# Patient Record
Sex: FEMALE | Race: WHITE | NOT HISPANIC OR LATINO
[De-identification: names, ages, dates, MRNs, and addresses within clinical notes are randomized per-mention and may not be internally consistent; named-entity substitution may affect disease eponyms.]

---

## 2017-01-11 PROBLEM — E89.0 POSTABLATIVE HYPOTHYROIDISM: Status: ACTIVE | Noted: 2017-01-11

## 2017-02-15 ENCOUNTER — HOSPITAL (OUTPATIENT)
Dept: OTHER | Age: 42
End: 2017-02-15
Attending: OBSTETRICS & GYNECOLOGY

## 2018-02-20 ENCOUNTER — HOSPITAL (OUTPATIENT)
Dept: OTHER | Age: 43
End: 2018-02-20
Attending: OBSTETRICS & GYNECOLOGY

## 2019-04-03 ENCOUNTER — HOSPITAL ENCOUNTER (OUTPATIENT)
Age: 44
Discharge: HOME | End: 2019-04-03
Payer: COMMERCIAL

## 2019-04-03 VITALS
WEIGHT: 200 LBS | HEART RATE: 64 BPM | TEMPERATURE: 98 F | RESPIRATION RATE: 20 BRPM | SYSTOLIC BLOOD PRESSURE: 137 MMHG | BODY MASS INDEX: 34.15 KG/M2 | OXYGEN SATURATION: 98 % | HEIGHT: 64 IN | DIASTOLIC BLOOD PRESSURE: 82 MMHG

## 2019-04-03 DIAGNOSIS — J02.0 STREPTOCOCCAL SORE THROAT: Primary | ICD-10-CM

## 2019-04-03 DIAGNOSIS — J01.90 ACUTE SINUSITIS, RECURRENCE NOT SPECIFIED, UNSPECIFIED LOCATION: ICD-10-CM

## 2019-04-03 PROCEDURE — 99203 OFFICE O/P NEW LOW 30 MIN: CPT

## 2019-04-03 PROCEDURE — 87430 STREP A AG IA: CPT

## 2019-04-03 PROCEDURE — 99204 OFFICE O/P NEW MOD 45 MIN: CPT

## 2019-04-03 RX ORDER — AMOXICILLIN AND CLAVULANATE POTASSIUM 875; 125 MG/1; MG/1
1 TABLET, FILM COATED ORAL 2 TIMES DAILY
Qty: 20 TABLET | Refills: 0 | Status: SHIPPED | OUTPATIENT
Start: 2019-04-03 | End: 2019-04-13

## 2019-04-03 RX ORDER — FLUCONAZOLE 150 MG/1
150 TABLET ORAL ONCE
Qty: 1 TABLET | Refills: 0 | Status: SHIPPED | OUTPATIENT
Start: 2019-04-03 | End: 2019-04-03

## 2019-04-03 NOTE — ED INITIAL ASSESSMENT (HPI)
REPORTS SORE THROAT AND COUGH X 1 WEEK. STATES SYMPTOMS WORSEN AT NIGHT. ALSO STATES SHE HAS HX OF ASTHMA AND HAS HAD TO USE HER ALBUTEROL INHALER 4-5 X DAY. DENIES FEVERS/CHILLS.

## 2019-04-03 NOTE — ED PROVIDER NOTES
Patient presents with:  Cough/URI      HPI:     Dena Lennox is a 53year old female who presents for evaluation of a chief complaint of sore throat, sinus discomfort, sinus congestion, and post nasal drip for over a week.   The patient states she just re Relationship status: Not on file      Intimate partner violence:        Fear of current or ex partner: Not on file        Emotionally abused: Not on file        Physically abused: Not on file        Forced sexual activity: Not on file    Other Topics treat her for strep throat and sinusitis with Augmentin. She is requesting a Diflucan due to her tendency to obtain a yeast vaginitis after taking a course of antibiotics.   I will recommend she continue supportive care and follow-up closely with her prima

## 2019-09-27 ENCOUNTER — HOSPITAL (OUTPATIENT)
Dept: OTHER | Age: 44
End: 2019-09-27
Attending: OBSTETRICS & GYNECOLOGY

## 2019-10-04 ENCOUNTER — HOSPITAL (OUTPATIENT)
Dept: OTHER | Age: 44
End: 2019-10-04
Attending: OBSTETRICS & GYNECOLOGY

## 2019-10-15 ENCOUNTER — HOSPITAL (OUTPATIENT)
Dept: OTHER | Age: 44
End: 2019-10-15
Attending: OBSTETRICS & GYNECOLOGY

## 2019-11-07 ENCOUNTER — HOSPITAL (OUTPATIENT)
Dept: OTHER | Age: 44
End: 2019-11-07
Attending: OBSTETRICS & GYNECOLOGY

## 2023-05-02 ENCOUNTER — OFFICE VISIT (OUTPATIENT)
Dept: INTERNAL MEDICINE CLINIC | Facility: CLINIC | Age: 48
End: 2023-05-02

## 2023-05-02 VITALS
WEIGHT: 208 LBS | BODY MASS INDEX: 35.51 KG/M2 | SYSTOLIC BLOOD PRESSURE: 114 MMHG | HEIGHT: 64 IN | DIASTOLIC BLOOD PRESSURE: 70 MMHG | HEART RATE: 57 BPM

## 2023-05-02 DIAGNOSIS — Z00.00 ANNUAL PHYSICAL EXAM: ICD-10-CM

## 2023-05-02 DIAGNOSIS — E89.0 POSTABLATIVE HYPOTHYROIDISM: ICD-10-CM

## 2023-05-02 DIAGNOSIS — Z12.31 VISIT FOR SCREENING MAMMOGRAM: Primary | ICD-10-CM

## 2023-05-02 DIAGNOSIS — M19.90 ARTHRITIS: ICD-10-CM

## 2023-05-02 PROCEDURE — 3074F SYST BP LT 130 MM HG: CPT | Performed by: NURSE PRACTITIONER

## 2023-05-02 PROCEDURE — 3008F BODY MASS INDEX DOCD: CPT | Performed by: NURSE PRACTITIONER

## 2023-05-02 PROCEDURE — 99386 PREV VISIT NEW AGE 40-64: CPT | Performed by: NURSE PRACTITIONER

## 2023-05-02 PROCEDURE — 3078F DIAST BP <80 MM HG: CPT | Performed by: NURSE PRACTITIONER

## 2023-05-02 RX ORDER — MONTELUKAST SODIUM 10 MG/1
10 TABLET ORAL NIGHTLY
Qty: 90 TABLET | Refills: 0 | Status: SHIPPED | OUTPATIENT
Start: 2023-05-02 | End: 2023-07-31

## 2023-05-02 RX ORDER — MELOXICAM 15 MG/1
15 TABLET ORAL DAILY
COMMUNITY
Start: 2022-04-01 | End: 2023-05-02

## 2023-05-02 RX ORDER — MELOXICAM 15 MG/1
15 TABLET ORAL DAILY
Qty: 90 TABLET | Refills: 0 | Status: SHIPPED | OUTPATIENT
Start: 2023-05-02 | End: 2023-07-31

## 2023-05-02 RX ORDER — FLUTICASONE PROPIONATE 50 MCG
2 SPRAY, SUSPENSION (ML) NASAL DAILY
Qty: 1 EACH | Refills: 3 | Status: SHIPPED | OUTPATIENT
Start: 2023-05-02 | End: 2023-07-31

## 2023-05-02 RX ORDER — VALACYCLOVIR HYDROCHLORIDE 500 MG/1
500 TABLET, FILM COATED ORAL DAILY
Qty: 90 TABLET | Refills: 0 | Status: SHIPPED | OUTPATIENT
Start: 2023-05-02

## 2023-05-02 NOTE — ASSESSMENT & PLAN NOTE
Normal exam.  Labs as ordered. Skin check normal.  No significant abnormal nevi. Breast exam completed-no palpable abnormalities, discharge from the nipples or axillary adenopathy. No cervical or inguinal lymphadenopathy. Hernial orifices intact.   Pelvic exam- Follows [facility]  Immunizations-Uptodate

## 2023-05-12 LAB
ABSOLUTE BASOPHILS: 29 CELLS/UL (ref 0–200)
ABSOLUTE EOSINOPHILS: 122 CELLS/UL (ref 15–500)
ABSOLUTE LYMPHOCYTES: 1502 CELLS/UL (ref 850–3900)
ABSOLUTE MONOCYTES: 516 CELLS/UL (ref 200–950)
ABSOLUTE NEUTROPHILS: 3631 CELLS/UL (ref 1500–7800)
ALBUMIN/GLOBULIN RATIO: 1.6 (CALC) (ref 1–2.5)
ALBUMIN: 4 G/DL (ref 3.6–5.1)
ALKALINE PHOSPHATASE: 48 U/L (ref 37–153)
ALT: 14 U/L (ref 6–29)
APPEARANCE: CLEAR
AST: 18 U/L (ref 10–35)
BASOPHILS: 0.5 %
BILIRUBIN, TOTAL: 0.4 MG/DL (ref 0.2–1.2)
BILIRUBIN: NEGATIVE
BUN: 13 MG/DL (ref 7–25)
CALCIUM: 9.3 MG/DL (ref 8.6–10.4)
CARBON DIOXIDE: 28 MMOL/L (ref 20–32)
CHLORIDE: 101 MMOL/L (ref 98–110)
CHOL/HDLC RATIO: 3.1 (CALC)
CHOLESTEROL, TOTAL: 172 MG/DL
COLOR: YELLOW
CREATININE: 0.73 MG/DL (ref 0.5–1.03)
EGFR: 100 ML/MIN/1.73M2
EOSINOPHILS: 2.1 %
GLOBULIN: 2.5 G/DL (CALC) (ref 1.9–3.7)
GLUCOSE: 81 MG/DL (ref 65–99)
GLUCOSE: NEGATIVE
HDL CHOLESTEROL: 56 MG/DL
HEMATOCRIT: 34.9 % (ref 35–45)
HEMOGLOBIN: 11.8 G/DL (ref 11.7–15.5)
KETONES: NEGATIVE
LDL-CHOLESTEROL: 99 MG/DL (CALC)
LEUKOCYTE ESTERASE: NEGATIVE
LYMPHOCYTES: 25.9 %
MCH: 30.8 PG (ref 27–33)
MCHC: 33.8 G/DL (ref 32–36)
MCV: 91.1 FL (ref 80–100)
MONOCYTES: 8.9 %
MPV: 11.8 FL (ref 7.5–12.5)
NEUTROPHILS: 62.6 %
NITRITE: NEGATIVE
NON-HDL CHOLESTEROL: 116 MG/DL (CALC)
OCCULT BLOOD: NEGATIVE
PH: 6 (ref 5–8)
PLATELET COUNT: 163 THOUSAND/UL (ref 140–400)
POTASSIUM: 4.2 MMOL/L (ref 3.5–5.3)
PROTEIN, TOTAL: 6.5 G/DL (ref 6.1–8.1)
PROTEIN: NEGATIVE
RDW: 12.3 % (ref 11–15)
RED BLOOD CELL COUNT: 3.83 MILLION/UL (ref 3.8–5.1)
SODIUM: 135 MMOL/L (ref 135–146)
SPECIFIC GRAVITY: 1 (ref 1–1.03)
T4, FREE: 1.6 NG/DL (ref 0.8–1.8)
TRIGLYCERIDES: 83 MG/DL
TSH W/REFLEX TO FT4: 0.26 MIU/L
VITAMIN B12: 396 PG/ML (ref 200–1100)
WHITE BLOOD CELL COUNT: 5.8 THOUSAND/UL (ref 3.8–10.8)

## 2023-05-13 ENCOUNTER — PATIENT MESSAGE (OUTPATIENT)
Dept: INTERNAL MEDICINE CLINIC | Facility: CLINIC | Age: 48
End: 2023-05-13

## 2023-05-15 RX ORDER — LEVOTHYROXINE SODIUM 137 UG/1
137 TABLET ORAL 2 TIMES DAILY
COMMUNITY
Start: 2023-05-12 | End: 2023-05-15

## 2023-05-15 NOTE — TELEPHONE ENCOUNTER
From: Michael Ash  To: JAYSON Gutierrez  Sent: 5/13/2023 7:20 AM CDT  Subject: Labs    Good morning! Thank you for sending a lab update quickly! My endocrinologist saw the labs and decreased my dose of levothyroxine. For some odd reason [facility] said my insurance would not cover a vitamin D level. When I see my rheumatologist in August, I will have her enter that lab with her other ones and see if, I can get it covered. Thank you!

## 2023-05-31 ENCOUNTER — HOSPITAL ENCOUNTER (OUTPATIENT)
Age: 48
Discharge: HOME | End: 2023-05-31
Payer: COMMERCIAL

## 2023-05-31 VITALS
OXYGEN SATURATION: 100 % | DIASTOLIC BLOOD PRESSURE: 76 MMHG | TEMPERATURE: 98 F | HEART RATE: 63 BPM | SYSTOLIC BLOOD PRESSURE: 143 MMHG | RESPIRATION RATE: 20 BRPM

## 2023-05-31 DIAGNOSIS — J02.9 VIRAL PHARYNGITIS: Primary | ICD-10-CM

## 2023-05-31 LAB — S PYO AG THROAT QL IA.RAPID: NEGATIVE

## 2023-05-31 PROCEDURE — 99203 OFFICE O/P NEW LOW 30 MIN: CPT

## 2023-05-31 PROCEDURE — 99212 OFFICE O/P EST SF 10 MIN: CPT

## 2023-05-31 PROCEDURE — 87651 STREP A DNA AMP PROBE: CPT | Performed by: NURSE PRACTITIONER

## 2023-11-01 ENCOUNTER — PATIENT MESSAGE (OUTPATIENT)
Dept: INTERNAL MEDICINE CLINIC | Facility: CLINIC | Age: 48
End: 2023-11-01

## 2023-11-01 DIAGNOSIS — E89.0 POSTABLATIVE HYPOTHYROIDISM: ICD-10-CM

## 2023-11-06 RX ORDER — ALBUTEROL SULFATE 90 UG/1
2 AEROSOL, METERED RESPIRATORY (INHALATION) EVERY 6 HOURS PRN
Qty: 3 EACH | Refills: 1 | Status: SHIPPED | OUTPATIENT
Start: 2023-11-06

## 2024-01-08 ENCOUNTER — OFFICE VISIT (OUTPATIENT)
Dept: INTERNAL MEDICINE CLINIC | Facility: CLINIC | Age: 49
End: 2024-01-08
Payer: COMMERCIAL

## 2024-01-08 VITALS
OXYGEN SATURATION: 100 % | HEIGHT: 64 IN | BODY MASS INDEX: 35.85 KG/M2 | WEIGHT: 210 LBS | SYSTOLIC BLOOD PRESSURE: 144 MMHG | DIASTOLIC BLOOD PRESSURE: 84 MMHG | HEART RATE: 55 BPM

## 2024-01-08 DIAGNOSIS — I10 PRIMARY HYPERTENSION: Primary | ICD-10-CM

## 2024-01-08 PROCEDURE — 3008F BODY MASS INDEX DOCD: CPT | Performed by: NURSE PRACTITIONER

## 2024-01-08 PROCEDURE — 99213 OFFICE O/P EST LOW 20 MIN: CPT | Performed by: NURSE PRACTITIONER

## 2024-01-08 PROCEDURE — 3077F SYST BP >= 140 MM HG: CPT | Performed by: NURSE PRACTITIONER

## 2024-01-08 PROCEDURE — 3079F DIAST BP 80-89 MM HG: CPT | Performed by: NURSE PRACTITIONER

## 2024-01-08 RX ORDER — HYDROXYCHLOROQUINE SULFATE 200 MG/1
400 TABLET, FILM COATED ORAL DAILY
COMMUNITY

## 2024-01-08 RX ORDER — LEVOTHYROXINE SODIUM 137 UG/1
137 TABLET ORAL DAILY
COMMUNITY

## 2024-01-08 RX ORDER — LISINOPRIL 2.5 MG/1
2.5 TABLET ORAL DAILY
Qty: 90 TABLET | Refills: 2 | Status: SHIPPED | OUTPATIENT
Start: 2024-01-08 | End: 2024-07-06

## 2024-01-08 RX ORDER — MELOXICAM 15 MG/1
15 TABLET ORAL
COMMUNITY
Start: 2023-08-29

## 2024-01-08 NOTE — PROGRESS NOTES
HPI:    Patient ID: Arlene Quiroga is a 48 year old female.    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  Blood pressure 144/84, pulse 55, height 5' 4\" (1.626 m), weight 210 lb (95.3 kg), SpO2 100%.   B/P 138/90.    Immunization History   Administered Date(s) Administered    Covid-19 Vaccine Pfizer 30 mcg/0.3 ml 01/17/2021, 02/07/2021, 01/07/2022    Influenza 10/01/2014, 09/21/2020, 09/22/2021, 09/06/2022    TDAP 06/11/2007, 05/15/2018       Past Medical History:   Diagnosis Date    Asthma     Essential hypertension     Hypothyroidism     s/p FRANCISCO for Graves disease      Past Surgical History:   Procedure Laterality Date    D & c        Social History     Socioeconomic History    Marital status:    Tobacco Use    Smoking status: Never    Smokeless tobacco: Never   Vaping Use    Vaping Use: Never used   Substance and Sexual Activity    Alcohol use: Yes     Comment: social    Drug use: Never          Review of Systems   Constitutional:  Negative for chills, fatigue and fever.   HENT:  Negative for congestion, ear discharge, ear pain, facial swelling, hearing loss, postnasal drip, sinus pressure, sore throat and trouble swallowing.    Eyes:  Negative for pain, discharge, redness and visual disturbance.   Respiratory:  Negative for cough, chest tightness, shortness of breath and wheezing.    Cardiovascular:  Negative for chest pain, palpitations and leg swelling.   Gastrointestinal:  Negative for abdominal distention, abdominal pain, constipation, diarrhea, nausea and vomiting.   Endocrine: Negative for cold intolerance, heat intolerance, polydipsia, polyphagia and polyuria.   Genitourinary:  Negative for difficulty urinating, dysuria, pelvic pain and vaginal bleeding.   Musculoskeletal:  Negative for back pain, gait problem, neck pain and neck stiffness.   Skin:  Negative  for color change and rash.   Neurological:  Negative for dizziness, seizures, weakness and headaches.   Psychiatric/Behavioral:  Negative for agitation and sleep disturbance. The patient is not nervous/anxious.               Current Outpatient Medications   Medication Sig Dispense Refill    hydroxychloroquine 200 MG Oral Tab Take 2 tablets (400 mg total) by mouth daily.      Meloxicam 15 MG Oral Tab Take 1 tablet (15 mg total) by mouth.      lisinopril 2.5 MG Oral Tab Take 1 tablet (2.5 mg total) by mouth daily. 90 tablet 2    albuterol 108 (90 Base) MCG/ACT Inhalation Aero Soln Inhale 2 puffs into the lungs every 6 (six) hours as needed for Wheezing. 3 each 1    montelukast 10 MG Oral Tab Take 1 tablet (10 mg total) by mouth nightly. 90 tablet 3    valACYclovir 500 MG Oral Tab Take 1 tablet (500 mg total) by mouth daily. 90 tablet 0    Ivermectin 1 % External Cream       levothyroxine 137 MCG Oral Tab Take 137 mcg by mouth daily.       Allergies:  Allergies   Allergen Reactions    Tapazole [Thiamazole]       PHYSICAL EXAM:   Physical Exam  Constitutional:       General: She is not in acute distress.     Appearance: Normal appearance. She is not ill-appearing, toxic-appearing or diaphoretic.   HENT:      Head: Normocephalic.      Mouth/Throat:      Mouth: Mucous membranes are moist.   Cardiovascular:      Rate and Rhythm: Normal rate and regular rhythm.      Pulses: Normal pulses.      Heart sounds: Normal heart sounds.   Pulmonary:      Effort: Pulmonary effort is normal.   Abdominal:      General: Abdomen is flat.      Palpations: Abdomen is soft.   Musculoskeletal:         General: Normal range of motion.      Cervical back: Normal range of motion.   Skin:     General: Skin is warm and dry.   Neurological:      Mental Status: She is alert and oriented to person, place, and time.   Psychiatric:         Mood and Affect: Mood normal.         Behavior: Behavior normal.         Thought Content: Thought content normal.          Judgment: Judgment normal.       /84 (BP Location: Right arm, Patient Position: Sitting, Cuff Size: adult)   Pulse 55   Ht 5' 4\" (1.626 m)   Wt 210 lb (95.3 kg)   SpO2 100%   BMI 36.05 kg/m²   Wt Readings from Last 2 Encounters:   01/08/24 210 lb (95.3 kg)   05/02/23 208 lb (94.3 kg)     Body mass index is 36.05 kg/m².(2)  Lab Results   Component Value Date    WBC 5.8 05/11/2023    RBC 3.83 05/11/2023    HGB 11.8 05/11/2023    HCT 34.9 (L) 05/11/2023    MCV 91.1 05/11/2023    MCH 30.8 05/11/2023    MCHC 33.8 05/11/2023    RDW 12.3 05/11/2023     05/11/2023      Lab Results   Component Value Date    GLU 81 05/11/2023    BUN 13 05/11/2023    BUNCREA NOT APPLICABLE 05/11/2023    CREATSERUM 0.73 05/11/2023    CA 9.3 05/11/2023    ALKPHO 48 05/11/2023    AST 18 05/11/2023    ALT 14 05/11/2023    BILT 0.4 05/11/2023    TP 6.5 05/11/2023    ALB 4.0 05/11/2023    GLOBULIN 2.5 05/11/2023    AGRATIO 1.6 05/11/2023     05/11/2023    K 4.2 05/11/2023     05/11/2023    CO2 28 05/11/2023      No results found for: \"EAG\", \"A1C\"   Lab Results   Component Value Date    CHOLEST 172 05/11/2023    TRIG 83 05/11/2023    HDL 56 05/11/2023    LDL 99 05/11/2023    TCHDLRATIO 3.1 05/11/2023    NONHDLC 116 05/11/2023      Lab Results   Component Value Date    T4F 1.6 05/11/2023    TSH 0.572 03/13/2021    TSHT4 0.26 (L) 05/11/2023                ASSESSMENT/PLAN:     Problem List Items Addressed This Visit       Primary hypertension - Primary     Blood pressure 144/84, pulse 55, height 5' 4\" (1.626 m), weight 210 lb (95.3 kg), SpO2 100%.    Will start on lisinopril 2.5mg po daily.  If blood pressure remains elevated may take an additional pill in a 24 hour period.  If she developes a cough may try amlodipine.    Controlling High Blood Pressure   High blood pressure (hypertension) is often called the silent killer. This is because many people who have it, don’t know it. It can be very dangerous. High blood  pressure can raise your risk of heart attack, stroke, heart disease, and heart failure. Controlling your blood pressure can lower your risk of these problems. It's important to check yourblood pressure regularly. It can save your life.   Blood pressure measurements are given as 2 numbers. Systolic blood pressure is the upper number. This is the pressure when the heart contracts. Diastolic blood pressure is the lower number. This is the pressure when the heartrelaxes between beats.   Blood pressure is grouped like this:   Normal blood pressure. This is systolic of less than 120 and diastolic of less than 80 (120/80).  Elevated blood pressure.  This is systolic of 120 to 129 and diastolic less than 80.  Stage 1 high blood pressure.  This is systolic of 130 to 139 or diastolic between 80 to 89.  Stage 2 high blood pressure.  This is systolic of 140 or higher or diastolic of 90 or higher.  A heart-healthy lifestyle can help you control your blood pressure withoutmedicines. Below are some things you can do to have a heart-healthy lifestyle.     Eat heart-healthy foods   Choose low-salt, low-fat foods. Limit your sodium to 2,300 mg per day or the amount advised by your healthcare provider.  Limit canned, dried, cured, packaged, and fast foods. These can contain a lot of salt.  Eat 8 to 10 servings of fruits and vegetables every day.  Choose lean meats, fish, or chicken.  Eat whole-grain pasta, brown rice, and beans.  Eat 2 to 3 servings of low-fat or fat-free dairy products.  Ask your doctor about the DASH eating plan. This plan helps reduce blood pressure.  When you go to a restaurant, ask that your meal be made with no added salt.    Stay at a healthy weight   Ask your healthcare provider how many calories to eat a day. Then stick to that number.  Ask your provider what weight range is healthiest for you. If you are overweight, a weight loss of only 3% to 5% of your body weight can help lower blood pressure. A good  weight loss goal is to lose 10% of your body weight in a year.  Limit snacks and sweets.  Get regular exercise.    Get more active   Find activities you enjoy. They can be done alone or with friends or family. Try bicycling, dancing, walking, or jogging.  Park farther away from building entrances to walk more.  Use stairs instead of the elevator.  When you can, walk or bike instead of driving.  Gibson leaves, garden, or do household repairs.  Be active at a moderate to vigorous level of physical activity for at least 30 minutes a day for at least 5 days a week.     Manage stress   Make time to relax and enjoy life. Find time to laugh.  Talk about your concerns with your loved ones and your healthcare provider.  Visit with family and friends, and keep up with hobbies.    Limit alcohol and quit smoking   Men should have no more than 2 drinks per day.  Women should have no more than 1 drink per day.  If you smoke, make a plan to stop. Talk with your healthcare provider for help. Smoking greatly raises your risk for heart disease and stroke. Ask your provider about stop-smoking programs and other support.    Blood pressure medicines  If your lifestyle changes aren’t enough, your healthcare provider may prescribe high blood pressure medicine. Take all medicines as prescribed. If you have any questions about yourmedicines, ask your provider before stopping or changing them.   [employer] last reviewed this educational content on12/1/2021    © 3745-2754 [employer]. All rights reserved. This information is not intended as a substitute for professional medical care. Always follow yourK[facility] Health Troycare professional's instruction    Video [email or web address]™  What is High Blood Pressure?  Understand what blood pressure is, the health risks of having high blood pressure, the factors that put you at risk for having high blood pressure, and the importance of working with your healthcare provider to control it.  To watch the  video:  Scan the QR code  Using your mobile device, scan the following code:  OR  Go to the website:  [employer]  Enter the prescription code:  3414E    © 3277-3059 [employer]. All rights reserved. This information is not intended as a substitute for professional medical care. Always follow your healthcare professional's instructions.    Video [employer]  Eating Well with High Blood Pressure  Certain foods can make your blood pressure go too high. Watch and learn how easy it is to have delicious meals without harming your health.     To watch the video:  Scan the QR code  Using your mobile device, scan the following code:  OR  Go to the website:  www.kramesvideo.com  Enter the prescription code:   QIX       © 2125-3648 [employer]. All rights reserved. This information is not intended as a substitute for professional medical care. Always follow your healthcare professional's instructions.    Taking Your Blood Pressure  Blood pressure is the force of blood against the artery wall as it moves from the heart through the blood vessels. You can take your own blood pressure reading using a digital monitor. Take your readings the same each time, usingthe same arm. Take readings as often as your healthcare provider advises.   About blood pressure monitors  Blood pressure monitors are designed for certain ages and cases. You can find monitors for older adults, for pregnant women, and for children. Make sure theone you choose is the right one for your age and situation.   Experts advise an automatic cuff monitor that fits on your upper arm (bicep). The cuff should fit your arm size. A cuff that’s too large or too small won't give an accurate reading. Measure around yourupper arm to find your size.   Monitors that attach to your finger or wrist are not as accurate as monitorsfor your upper arm.   Ask your healthcare provider for help in choosing a monitor. Bring your monitorto  your next provider visit if you need help in using it the correct way.   The steps below are general instructions for using an automatic digitalmonitor.   Step 1. Relax    Take your blood pressure at the same time every day, such as in the morning or evening. Or take it at the time your healthcare provider advises.  Wait at least 30 minutes after smoking, eating, or exercising. Don't drink coffee, tea, soda, or other caffeinated drinks before checking your blood pressure. Use the restroom beforehand.  Sit comfortably at a table with both feet on the floor. Don't cross your legs or feet. Place the monitor near you.  Rest for at least 5 minutes before you begin. Make sure there are no distractions. This includes TV, cell phones, and other electronics. Wait to have conversations with others until after you measure you blood pressure.  Step 2. Wrap the cuff    Place your arm on the table, palm up. Your arm should be at the level of your heart. Wrap the cuff around your upper arm, just above your elbow. It’s best done on bare skin, not over clothing. Most cuffs will show you where the blood vessel in the middle of the arm at the inner side of the elbow (the brachial artery) should line up with the cuff. Look in your monitor's instruction booklet for an illustration. You can also bring your cuff to your healthcare provider and have them show you how to correctly place the cuff.  Step 3. Inflate the cuff    Push the button that starts the pump.  The cuff will tighten, then loosen.  The numbers will change. When they stop changing, your blood pressure reading will appear.  Take 2 or 3 readings 1 minute apart, or as advised by your provider.  Step 4. Write down the results of each reading    Write down your blood pressure numbers for each reading. Note the date and time. Keep your results in 1 place, such as a notebook. Even if your monitor has a built-in memory, keep a hard copy of the readings.  Remove the cuff from your  arm. Turn off the machine.  Bring your blood pressure records with you to each provider visit.  If you start a new blood pressure medicine, note the day you started the new medicine. Also note the day if you change the dose of your medicine. Measure your blood pressure before your take your medicine. This information goes on your blood pressure recording sheet. This will help your provider check how well the medicine changes are working.  Ask your provider what numbers mean that you should call them. Also ask what numbers mean that you should get help right away.  [employer] last reviewed this educational content on12/1/2021    © 8068-1463 [employer]. All rights reserved. This information is not intended as a substitute for professional medical care. Always follow yourhealthcare professional's instructions.                        Relevant Medications    lisinopril 2.5 MG Oral Tab          No orders of the defined types were placed in this encounter.      Meds This Visit:  Requested Prescriptions     Signed Prescriptions Disp Refills    lisinopril 2.5 MG Oral Tab 90 tablet 2     Sig: Take 1 tablet (2.5 mg total) by mouth daily.       Imaging & Referrals:  None         JAYSON Jesus

## 2024-01-09 NOTE — ASSESSMENT & PLAN NOTE
Blood pressure 144/84, pulse 55, height 5' 4\" (1.626 m), weight 210 lb (95.3 kg), SpO2 100%.    Will start on lisinopril 2.5mg po daily.  If blood pressure remains elevated may take an additional pill in a 24 hour period.  If she developes a cough may try amlodipine.    Controlling High Blood Pressure   High blood pressure (hypertension) is often called the silent killer. This is because many people who have it, don’t know it. It can be very dangerous. High blood pressure can raise your risk of heart attack, stroke, heart disease, and heart failure. Controlling your blood pressure can lower your risk of these problems. It's important to check yourblood pressure regularly. It can save your life.   Blood pressure measurements are given as 2 numbers. Systolic blood pressure is the upper number. This is the pressure when the heart contracts. Diastolic blood pressure is the lower number. This is the pressure when the heartrelaxes between beats.   Blood pressure is grouped like this:   Normal blood pressure. This is systolic of less than 120 and diastolic of less than 80 (120/80).  Elevated blood pressure.  This is systolic of 120 to 129 and diastolic less than 80.  Stage 1 high blood pressure.  This is systolic of 130 to 139 or diastolic between 80 to 89.  Stage 2 high blood pressure.  This is systolic of 140 or higher or diastolic of 90 or higher.  A heart-healthy lifestyle can help you control your blood pressure withoutmedicines. Below are some things you can do to have a heart-healthy lifestyle.     Eat heart-healthy foods   Choose low-salt, low-fat foods. Limit your sodium to 2,300 mg per day or the amount advised by your healthcare provider.  Limit canned, dried, cured, packaged, and fast foods. These can contain a lot of salt.  Eat 8 to 10 servings of fruits and vegetables every day.  Choose lean meats, fish, or chicken.  Eat whole-grain pasta, brown rice, and beans.  Eat 2 to 3 servings of low-fat or fat-free  dairy products.  Ask your doctor about the DASH eating plan. This plan helps reduce blood pressure.  When you go to a restaurant, ask that your meal be made with no added salt.    Stay at a healthy weight   Ask your healthcare provider how many calories to eat a day. Then stick to that number.  Ask your provider what weight range is healthiest for you. If you are overweight, a weight loss of only 3% to 5% of your body weight can help lower blood pressure. A good weight loss goal is to lose 10% of your body weight in a year.  Limit snacks and sweets.  Get regular exercise.    Get more active   Find activities you enjoy. They can be done alone or with friends or family. Try bicycling, dancing, walking, or jogging.  Park farther away from building entrances to walk more.  Use stairs instead of the elevator.  When you can, walk or bike instead of driving.  [place] leaves, garden, or do household repairs.  Be active at a moderate to vigorous level of physical activity for at least 30 minutes a day for at least 5 days a week.     Manage stress   Make time to relax and enjoy life. Find time to laugh.  Talk about your concerns with your loved ones and your healthcare provider.  Visit with family and friends, and keep up with hobbies.    Limit alcohol and quit smoking   Men should have no more than 2 drinks per day.  Women should have no more than 1 drink per day.  If you smoke, make a plan to stop. Talk with your healthcare provider for help. Smoking greatly raises your risk for heart disease and stroke. Ask your provider about stop-smoking programs and other support.    Blood pressure medicines  If your lifestyle changes aren’t enough, your healthcare provider may prescribe high blood pressure medicine. Take all medicines as prescribed. If you have any questions about yourmedicines, ask your provider before stopping or changing them.   Pao last reviewed this educational content on12/1/2021    © 8817-1679 [employer]. All rights reserved. This information is not intended as a substitute for professional medical care. Always follow yourhealthcare professional's instruction    Video [employer]™  What is High Blood Pressure?  Understand what blood pressure is, the health risks of having high blood pressure, the factors that put you at risk for having high blood pressure, and the importance of working with your healthcare provider to control it.  To watch the video:  Scan the QR code  Using your mobile device, scan the following code:  OR  Go to the website:  [employer]  Enter the prescription code:  3414E    © 4840-8946 [employer]. All rights reserved. This information is not intended as a substitute for professional medical care. Always follow your healthcare professional's instructions.    Video [employer]™  Eating Well with High Blood Pressure  Certain foods can make your blood pressure go too high. Watch and learn how easy it is to have delicious meals without harming your health.     To watch the video:  Scan the QR code  Using your mobile device, scan the following code:  OR  Go to the website:  www.kramesvideo.com  Enter the prescription code:   QIX       © 0172-2878 [employer]. All rights reserved. This information is not intended as a substitute for professional medical care. Always follow your healthcare professional's instructions.    Taking Your Blood Pressure  Blood pressure is the force of blood against the artery wall as it moves from the heart through the blood vessels. You can take your own blood pressure reading using a digital monitor. Take your readings the same each time, usingthe same arm. Take readings as often as your healthcare provider advises.   About blood pressure monitors  Blood pressure monitors are designed for certain ages and cases. You can find monitors for older adults, for pregnant women, and for children. Make sure theone you  choose is the right one for your age and situation.   Experts advise an automatic cuff monitor that fits on your upper arm (bicep). The cuff should fit your arm size. A cuff that’s too large or too small won't give an accurate reading. Measure around yourupper arm to find your size.   Monitors that attach to your finger or wrist are not as accurate as monitorsfor your upper arm.   Ask your healthcare provider for help in choosing a monitor. Bring your monitorto your next provider visit if you need help in using it the correct way.   The steps below are general instructions for using an automatic digitalmonitor.   Step 1. Relax    Take your blood pressure at the same time every day, such as in the morning or evening. Or take it at the time your healthcare provider advises.  Wait at least 30 minutes after smoking, eating, or exercising. Don't drink coffee, tea, soda, or other caffeinated drinks before checking your blood pressure. Use the restroom beforehand.  Sit comfortably at a table with both feet on the floor. Don't cross your legs or feet. Place the monitor near you.  Rest for at least 5 minutes before you begin. Make sure there are no distractions. This includes TV, cell phones, and other electronics. Wait to have conversations with others until after you measure you blood pressure.  Step 2. Wrap the cuff    Place your arm on the table, palm up. Your arm should be at the level of your heart. Wrap the cuff around your upper arm, just above your elbow. It’s best done on bare skin, not over clothing. Most cuffs will show you where the blood vessel in the middle of the arm at the inner side of the elbow (the brachial artery) should line up with the cuff. Look in your monitor's instruction booklet for an illustration. You can also bring your cuff to your healthcare provider and have them show you how to correctly place the cuff.  Step 3. Inflate the cuff    Push the button that starts the pump.  The cuff will  tighten, then loosen.  The numbers will change. When they stop changing, your blood pressure reading will appear.  Take 2 or 3 readings 1 minute apart, or as advised by your provider.  Step 4. Write down the results of each reading    Write down your blood pressure numbers for each reading. Note the date and time. Keep your results in 1 place, such as a notebook. Even if your monitor has a built-in memory, keep a hard copy of the readings.  Remove the cuff from your arm. Turn off the machine.  Bring your blood pressure records with you to each provider visit.  If you start a new blood pressure medicine, note the day you started the new medicine. Also note the day if you change the dose of your medicine. Measure your blood pressure before your take your medicine. This information goes on your blood pressure recording sheet. This will help your provider check how well the medicine changes are working.  Ask your provider what numbers mean that you should call them. Also ask what numbers mean that you should get help right away.  Pao last reviewed this educational content on12/1/2021 © 2000-2022 [employer]. All rights reserved. This information is not intended as a substitute for professional medical care. Always follow yourhealthcare professional's instructions.

## 2024-02-02 DIAGNOSIS — E89.0 POSTABLATIVE HYPOTHYROIDISM: ICD-10-CM

## 2024-02-03 ENCOUNTER — TELEPHONE (OUTPATIENT)
Dept: INTERNAL MEDICINE CLINIC | Facility: CLINIC | Age: 49
End: 2024-02-03

## 2024-02-03 RX ORDER — FLUTICASONE PROPIONATE 50 MCG
2 SPRAY, SUSPENSION (ML) NASAL DAILY
Qty: 48 G | Refills: 3 | Status: SHIPPED | OUTPATIENT
Start: 2024-02-03

## 2024-02-03 NOTE — TELEPHONE ENCOUNTER
PCP showing Unknown Pcp   RN generated PCP change request per protocol.     Dr Martínez =collaborating physician for ESTHER Jesus.

## 2024-02-03 NOTE — TELEPHONE ENCOUNTER
Refill passed per [facility] protocol.     Requested Prescriptions   Pending Prescriptions Disp Refills    FLUTICASONE PROPIONATE 50 MCG/ACT Nasal Suspension [Pharmacy Med Name: FLUTICASONE 50MCG NASAL SP (120) RX] 48 g 0     Sig: SHAKE LIQUID AND USE 2 SPRAYS IN EACH NOSTRIL DAILY       Allergy Medication Protocol Passed - 2/2/2024  8:01 AM        Passed - In person appointment or virtual visit in the past 12 mos or appointment in next 3 mos     Recent Outpatient Visits              3 weeks ago [facility] hypertension    [facility]    Office Visit    9 months ago [facility]    Office Visit    1 year ago Postablative hypothyroidism    Endocrinology - Bernie Snow Mary, MD    Office Visit    2 years ago Encounter for gynecological examination without abnormal finding    [facility] [place] [facility] [facility] [place] [place]    Office Visit    2 years ago Postablative hypothyroidism    Endocrinology - Bernie Snow Mary, MD    Office Visit          Future Appointments         Provider Department Appt Notes    In 6 months Elham Corrales MD [facility] Yearly thyroid check                     Future Appointments         Provider Department Appt Notes    In 6 months Elham Corrales MD [facility] Yearly thyroid check             Recent Outpatient Visits              3 weeks ago [facility]    [facility] Visit    9 months ago [facility]    Office Visit    1 year ago Postablative hypothyroidism    Endocrinology - Bernie Snow Mary, MD    Office  Visit    2 years ago Encounter for gynecological examination without abnormal finding    [facility] - [facility] Lam Elizabeth, MD    Office Visit    2 years ago Postablative hypothyroidism    Endocrinology - Bernie Snow Mary, MD    Office Visit

## 2024-02-05 ENCOUNTER — PATIENT OUTREACH (OUTPATIENT)
Dept: CASE MANAGEMENT | Age: 49
End: 2024-02-05

## 2024-02-05 NOTE — PROCEDURES
The office order for PCP removal request is Approved and finalized on February 5, 2024.    Added Dr. Dank Logan to PCP field  Added JAYSON Jesus to Team member     Thanks,  [facility] Team

## 2024-03-21 DIAGNOSIS — Z00.00 ANNUAL PHYSICAL EXAM: Primary | ICD-10-CM

## 2024-03-29 LAB
ABSOLUTE BASOPHILS: 38 CELLS/UL (ref 0–200)
ABSOLUTE EOSINOPHILS: 72 CELLS/UL (ref 15–500)
ABSOLUTE LYMPHOCYTES: 1498 CELLS/UL (ref 850–3900)
ABSOLUTE MONOCYTES: 442 CELLS/UL (ref 200–950)
ABSOLUTE NEUTROPHILS: 2750 CELLS/UL (ref 1500–7800)
ALBUMIN/GLOBULIN RATIO: 1.8 (CALC) (ref 1–2.5)
ALBUMIN: 4.4 G/DL (ref 3.6–5.1)
ALKALINE PHOSPHATASE: 54 U/L (ref 37–153)
ALT: 13 U/L (ref 6–29)
AST: 19 U/L (ref 10–35)
BASOPHILS: 0.8 %
BILIRUBIN, TOTAL: 0.5 MG/DL (ref 0.2–1.2)
BUN: 17 MG/DL (ref 7–25)
CALCIUM: 9.4 MG/DL (ref 8.6–10.4)
CARBON DIOXIDE: 24 MMOL/L (ref 20–32)
CARDIO CRP(R): 0.7 MG/L
CHLORIDE: 102 MMOL/L (ref 98–110)
CHOL/HDLC RATIO: 2.8 (CALC)
CHOLESTEROL, TOTAL: 177 MG/DL
CREATININE: 0.74 MG/DL (ref 0.5–1.03)
EGFR: 98 ML/MIN/1.73M2
EOSINOPHILS: 1.5 %
GLOBULIN: 2.5 G/DL (CALC) (ref 1.9–3.7)
GLUCOSE: 71 MG/DL (ref 65–99)
HDL CHOLESTEROL: 64 MG/DL
HEMATOCRIT: 37.9 % (ref 35–45)
HEMOGLOBIN: 12.3 G/DL (ref 11.7–15.5)
LDL-CHOLESTEROL: 96 MG/DL (CALC)
LYMPHOCYTES: 31.2 %
MCH: 30.1 PG (ref 27–33)
MCHC: 32.5 G/DL (ref 32–36)
MCV: 92.9 FL (ref 80–100)
MONOCYTES: 9.2 %
MPV: 12 FL (ref 7.5–12.5)
NEUTROPHILS: 57.3 %
NON-HDL CHOLESTEROL: 113 MG/DL (CALC)
PLATELET COUNT: 170 THOUSAND/UL (ref 140–400)
POTASSIUM: 4.2 MMOL/L (ref 3.5–5.3)
PROTEIN, TOTAL: 6.9 G/DL (ref 6.1–8.1)
RDW: 12.5 % (ref 11–15)
RED BLOOD CELL COUNT: 4.08 MILLION/UL (ref 3.8–5.1)
SODIUM: 137 MMOL/L (ref 135–146)
T4, FREE: 1.1 NG/DL (ref 0.8–1.8)
TRIGLYCERIDES: 78 MG/DL
TSH W/REFLEX TO FT4: 8.23 MIU/L
WHITE BLOOD CELL COUNT: 4.8 THOUSAND/UL (ref 3.8–10.8)

## 2024-03-30 DIAGNOSIS — E89.0 POSTABLATIVE HYPOTHYROIDISM: Primary | ICD-10-CM

## 2024-03-30 RX ORDER — LEVOTHYROXINE SODIUM 0.15 MG/1
150 TABLET ORAL
Qty: 90 TABLET | Refills: 1 | Status: SHIPPED | OUTPATIENT
Start: 2024-03-30 | End: 2025-03-29

## 2024-05-13 DIAGNOSIS — E89.0 POSTABLATIVE HYPOTHYROIDISM: ICD-10-CM

## 2024-05-14 RX ORDER — MONTELUKAST SODIUM 10 MG/1
10 TABLET ORAL NIGHTLY
Qty: 90 TABLET | Refills: 3 | OUTPATIENT
Start: 2024-05-14

## 2024-07-04 LAB
T4, FREE: 1.4 NG/DL (ref 0.8–1.8)
TSH W/REFLEX TO FT4: 6.32 MIU/L

## 2024-08-13 DIAGNOSIS — E89.0 POSTABLATIVE HYPOTHYROIDISM: ICD-10-CM

## 2024-08-19 RX ORDER — MONTELUKAST SODIUM 10 MG/1
10 TABLET ORAL NIGHTLY
Qty: 90 TABLET | Refills: 3 | Status: SHIPPED | OUTPATIENT
Start: 2024-08-19

## 2024-08-19 NOTE — TELEPHONE ENCOUNTER
Please review.  Protocol failed / Has no protocol.     Requested Prescriptions   Pending Prescriptions Disp Refills    montelukast 10 MG Oral Tab 90 tablet 3     Sig: Take 1 tablet (10 mg total) by mouth nightly.       Asthma & COPD Medication Protocol Failed - 8/13/2024  3:52 PM        Failed - Asthma Action Score greater than or equal to 20        Failed - Appointment in past 6 or next 3 months      Recent Outpatient Visits              7 months ago Primary hypertension    [facility] Marilee Soliman APRN    Office Visit    1 year ago Visit for screening mammogram    [facility] Marilee Soliman APRN    Office Visit    2 years ago Postablative hypothyroidism    Endocrinology - Bernie Snow Mary, MD    Office Visit    3 years ago Encounter for gynecological examination without abnormal finding    [facility] - [facility] Lam Elizabeth, MD    Office Visit    3 years ago Postablative hypothyroidism    Endocrinology - Bernie Snow Mary, MD    Office Visit          Future Appointments         Provider Department Appt Notes    In 3 days Elham Corrales MD [facility] Yearly thyroid check    In 3 weeks Marilee Soliman APRN [facility] Annual exam 5/2/23                    Failed - AAP/ACT given in last 12 months     No data recorded  No data recorded  No data recorded  No data recorded             Future Appointments         Provider Department Appt Notes    In 3 days Elham Corrales MD [facility] Yearly thyroid check    In 3 weeks Marilee Soliman APRN [facility] Annual exam 5/2/23          Recent Outpatient Visits              7 months ago Primary hypertension    [facility]  Marilee Soliman APRN    Office Visit    1 year ago Visit for screening mammogram    [facility] Marilee Soliman APRN    Office Visit    2 years ago Postablative hypothyroidism    Endocrinology - Bernie Snow Mary, MD    Office Visit    3 years ago Encounter for gynecological examination without abnormal finding    OB-GYN - Siobhan Lam Elizabeth, MD    Office Visit    3 years ago Postablative hypothyroidism    Endocrinology - Bernie Snow Mary, MD    Office Visit

## 2024-09-12 ENCOUNTER — OFFICE VISIT (OUTPATIENT)
Dept: INTERNAL MEDICINE CLINIC | Facility: CLINIC | Age: 49
End: 2024-09-12
Payer: COMMERCIAL

## 2024-09-12 VITALS
HEART RATE: 68 BPM | HEIGHT: 64 IN | SYSTOLIC BLOOD PRESSURE: 120 MMHG | WEIGHT: 219 LBS | BODY MASS INDEX: 37.39 KG/M2 | DIASTOLIC BLOOD PRESSURE: 70 MMHG

## 2024-09-12 DIAGNOSIS — E89.0 POSTABLATIVE HYPOTHYROIDISM: ICD-10-CM

## 2024-09-12 DIAGNOSIS — I10 PRIMARY HYPERTENSION: ICD-10-CM

## 2024-09-12 DIAGNOSIS — Z00.00 ANNUAL PHYSICAL EXAM: Primary | ICD-10-CM

## 2024-09-12 PROCEDURE — 99396 PREV VISIT EST AGE 40-64: CPT | Performed by: NURSE PRACTITIONER

## 2024-09-12 RX ORDER — VALACYCLOVIR HYDROCHLORIDE 500 MG/1
500 TABLET, FILM COATED ORAL DAILY
Qty: 90 TABLET | Refills: 0 | Status: SHIPPED | OUTPATIENT
Start: 2024-09-12

## 2024-09-12 RX ORDER — MONTELUKAST SODIUM 10 MG/1
10 TABLET ORAL NIGHTLY
Qty: 90 TABLET | Refills: 3 | Status: SHIPPED | OUTPATIENT
Start: 2024-09-12

## 2024-09-12 RX ORDER — FLUTICASONE PROPIONATE 50 MCG
2 SPRAY, SUSPENSION (ML) NASAL DAILY
Qty: 48 G | Refills: 3 | Status: SHIPPED | OUTPATIENT
Start: 2024-09-12

## 2024-09-12 RX ORDER — ALBUTEROL SULFATE 90 UG/1
2 AEROSOL, METERED RESPIRATORY (INHALATION) EVERY 6 HOURS PRN
Qty: 3 EACH | Refills: 3 | Status: SHIPPED | OUTPATIENT
Start: 2024-09-12 | End: 2024-12-11

## 2024-09-12 NOTE — PROGRESS NOTES
HPI:    Patient ID: Arlene Quiroga is a 49 year old female.  Specialty [facility].  Lives with daughters  Sees [facility]    HPI Physical Exam  49 year old female is here for annual physical exam   She is here to discuss health maintenance issues.   She follows with rheumatology and endocrinology.    HTN  Blood pressure 120/70, pulse 68, height 5' 4\" (1.626 m), weight 219 lb (99.3 kg).     Was on lisinopril 2.5 mg  for several weeks and then her B/P has been normal.  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    Mammogram- Follows with [facility]- Completed    IMPRESSION AND RECOMMENDATION   6/19/2024  No specific mammographic evidence of malignancy.  If there is no other clinical concern, the patient   should return in 1 year for an annual screening mammogram.     Colonoscopy-  Immunization History   Administered Date(s) Administered    Covid-19 Vaccine Pfizer 30 mcg/0.3 ml 01/17/2021, 02/07/2021, 01/07/2022    Influenza 10/01/2014, 09/21/2020, 09/22/2021, 09/06/2022    TDAP 06/11/2007, 05/15/2018       Past Medical History:    Asthma (HCC)    Essential hypertension    Hypothyroidism    s/p FRANCISCO for Graves disease      Past Surgical History:   Procedure Laterality Date    D & c        Social History     Socioeconomic History    Marital status:    Tobacco Use    Smoking status: Never    Smokeless tobacco: Never   Vaping Use    Vaping status: Never Used   Substance and Sexual Activity    Alcohol use: Yes     Comment: social    Drug use: Never          Review of Systems   Constitutional:  Negative for chills, fatigue and fever.   HENT:  Negative for congestion, ear pain, hearing loss, sinus pressure, sinus pain, sore throat, trouble swallowing and voice change.    Eyes:  Negative for pain, redness and visual disturbance.   Respiratory:  Negative for cough, chest tightness and shortness of breath.    Cardiovascular:  Positive for palpitations. Negative for chest pain and leg swelling.   Gastrointestinal:   Negative for abdominal pain, constipation, diarrhea (Drug induced), nausea and vomiting.   Endocrine: Negative for cold intolerance and heat intolerance.   Genitourinary:  Negative for dysuria and hematuria.   Musculoskeletal:  Positive for arthralgias. Negative for back pain and joint swelling.   Skin:  Negative for rash.   Allergic/Immunologic: Positive for environmental allergies. Negative for food allergies.   Neurological:  Negative for weakness, numbness and headaches.   Hematological:  Does not bruise/bleed easily.   Psychiatric/Behavioral:  Negative for dysphoric mood and sleep disturbance. The patient is not nervous/anxious.               Current Outpatient Medications   Medication Sig Dispense Refill    Methotrexate 20 MG/0.8ML Subcutaneous Solution Prefilled Syringe       montelukast 10 MG Oral Tab Take 1 tablet (10 mg total) by mouth nightly. 90 tablet 3    levothyroxine 150 MCG Oral Tab Take 1 tablet (150 mcg total) by mouth every morning before breakfast. 90 tablet 1    fluticasone propionate 50 MCG/ACT Nasal Suspension 2 sprays by Nasal route daily. 48 g 3    hydroxychloroquine 200 MG Oral Tab Take 2 tablets (400 mg total) by mouth daily.      Meloxicam 15 MG Oral Tab Take 1 tablet (15 mg total) by mouth.      albuterol 108 (90 Base) MCG/ACT Inhalation Aero Soln Inhale 2 puffs into the lungs every 6 (six) hours as needed for Wheezing. 3 each 1    valACYclovir 500 MG Oral Tab Take 1 tablet (500 mg total) by mouth daily. 90 tablet 0    Ivermectin 1 % External Cream        Allergies:  Allergies   Allergen Reactions    Tapazole [Thiamazole]       PHYSICAL EXAM:   Physical Exam  Constitutional:       Appearance: Normal appearance. She is well-developed.   HENT:      Head: Normocephalic.      Right Ear: Tympanic membrane normal.      Left Ear: Tympanic membrane normal.      Nose: Nose normal.      Mouth/Throat:      Mouth: Mucous membranes are moist.      Pharynx: No oropharyngeal exudate or posterior  oropharyngeal erythema.   Eyes:      General:         Right eye: No discharge.         Left eye: No discharge.      Pupils: Pupils are equal, round, and reactive to light.   Cardiovascular:      Rate and Rhythm: Normal rate and regular rhythm.      Heart sounds: Normal heart sounds. No murmur heard.     No friction rub. No gallop.   Pulmonary:      Effort: Pulmonary effort is normal. No respiratory distress.      Breath sounds: Normal breath sounds. No wheezing, rhonchi or rales.   Abdominal:      General: Bowel sounds are normal. There is no distension.      Palpations: Abdomen is soft. There is no mass.      Tenderness: There is no abdominal tenderness. There is no right CVA tenderness, left CVA tenderness or guarding.   Musculoskeletal:         General: No tenderness.      Cervical back: Normal range of motion and neck supple. No tenderness.      Right lower leg: No edema.      Left lower leg: No edema.   Lymphadenopathy:      Cervical: No cervical adenopathy.   Skin:     General: Skin is warm and dry.      Findings: No rash.   Neurological:      Mental Status: She is alert and oriented to person, place, and time.      Coordination: Coordination normal.      Gait: Gait normal.   Psychiatric:         Mood and Affect: Mood normal.         Behavior: Behavior normal.         Thought Content: Thought content normal.         Judgment: Judgment normal.       /70 (BP Location: Right arm, Patient Position: Sitting, Cuff Size: large)   Pulse 68   Ht 5' 4\" (1.626 m)   Wt 219 lb (99.3 kg)   BMI 37.59 kg/m²   Wt Readings from Last 2 Encounters:   09/12/24 219 lb (99.3 kg)   01/08/24 210 lb (95.3 kg)     Body mass index is 37.59 kg/m².(2)  Lab Results   Component Value Date    WBC 4.8 03/28/2024    RBC 4.08 03/28/2024    HGB 12.3 03/28/2024    HCT 37.9 03/28/2024    MCV 92.9 03/28/2024    MCH 30.1 03/28/2024    MCHC 32.5 03/28/2024    RDW 12.5 03/28/2024     03/28/2024      Lab Results   Component Value Date     GLU 71 03/28/2024    BUN 17 03/28/2024    BUNCREA SEE NOTE: 03/28/2024    CREATSERUM 0.74 03/28/2024    CA 9.4 03/28/2024    ALKPHO 54 03/28/2024    AST 19 03/28/2024    ALT 13 03/28/2024    BILT 0.5 03/28/2024    TP 6.9 03/28/2024    ALB 4.4 03/28/2024    GLOBULIN 2.5 03/28/2024    AGRATIO 1.8 03/28/2024     03/28/2024    K 4.2 03/28/2024     03/28/2024    CO2 24 03/28/2024      No results found for: \"EAG\", \"A1C\"   Lab Results   Component Value Date    CHOLEST 177 03/28/2024    TRIG 78 03/28/2024    HDL 64 03/28/2024    LDL 96 03/28/2024    TCHDLRATIO 2.8 03/28/2024    NONHDLC 113 03/28/2024      Lab Results   Component Value Date    T4F 1.4 07/03/2024    TSH 0.572 03/13/2021    TSHT4 6.32 (H) 07/03/2024                ASSESSMENT/PLAN:     Problem List Items Addressed This Visit       Annual physical exam - Primary     Normal exam.  Labs as ordered.  Skin check normal.  No significant abnormal nevi.  Breast exam completed-no palpable abnormalities, discharge from the nipples or axillary adenopathy.  No cervical or inguinal lymphadenopathy.  Hernial orifices intact.  Pelvic exam- Follows [facility]  Immunizations-Uptodate            Primary hypertension     Blood pressure 120/70, pulse 68, height 5' 4\" (1.626 m), weight 219 lb (99.3 kg).    Blood pressure stable- Off of medication               No orders of the defined types were placed in this encounter.      Meds This Visit:  Requested Prescriptions      No prescriptions requested or ordered in this encounter       Imaging & Referrals:  None         JAYSON Jesus

## 2024-09-12 NOTE — ASSESSMENT & PLAN NOTE
Blood pressure 120/70, pulse 68, height 5' 4\" (1.626 m), weight 219 lb (99.3 kg).    Blood pressure stable- Off of medication

## 2024-11-29 ENCOUNTER — OFFICE VISIT (OUTPATIENT)
Dept: RHEUMATOLOGY | Facility: CLINIC | Age: 49
End: 2024-11-29
Payer: COMMERCIAL

## 2024-11-29 VITALS
BODY MASS INDEX: 37.39 KG/M2 | RESPIRATION RATE: 16 BRPM | DIASTOLIC BLOOD PRESSURE: 80 MMHG | HEIGHT: 64 IN | SYSTOLIC BLOOD PRESSURE: 150 MMHG | HEART RATE: 60 BPM | WEIGHT: 219 LBS

## 2024-11-29 DIAGNOSIS — M79.641 BILATERAL HAND PAIN: ICD-10-CM

## 2024-11-29 DIAGNOSIS — M79.642 BILATERAL HAND PAIN: ICD-10-CM

## 2024-11-29 DIAGNOSIS — M06.00 SERONEGATIVE RHEUMATOID ARTHRITIS (HCC): Primary | ICD-10-CM

## 2024-11-29 PROCEDURE — 99204 OFFICE O/P NEW MOD 45 MIN: CPT | Performed by: INTERNAL MEDICINE

## 2024-11-29 RX ORDER — PREDNISONE 10 MG/1
TABLET ORAL
Qty: 20 TABLET | Refills: 0 | Status: SHIPPED | OUTPATIENT
Start: 2024-11-29

## 2024-11-29 NOTE — PATIENT INSTRUCTIONS
You were seen today for joint pain mostly in your hands  For now continue methotrexate and Kevzara  Get blood work  Get MRI of the right hand  When the MRI is done please start prednisone 30 mg for 3 days then 20 mg daily for 3 days then 10 mg daily for 3 days then stop  Please send me a message in Answer.To when the MRI is done

## 2024-11-29 NOTE — PROGRESS NOTES
Arlenebuster Quiroga is a 49 year old female who presents for No chief complaint on file.  .   HPI:   CC: joint pain   Consult: referred by PCP Dr. Arlene Quiroga     This is a 48 yo F with hx of Grave's disease s/p ablation with acquired hypothyroidism, Asthma referred for joint pain. She was following with rheumatologist Dr. Julieta Gutierrez. She started to have b/l hand pain for the past 3 years around 2021. She has pain in the DIPs, PIPs and MCPs. Most of the pain is in the PIPs and DIPs with swelling in the PIP region. At times hard to make a fist. Has morning stiffness in the hands for about 30 minutes.  Has some pain in right elbow. Also some pain in the right shoulder for many years.  No hx of psoriasis.  She reports a history of bilateral plantar fasciitis for many years.  She received PRP injections in her right foot but did not help.  She hurt her back skiing in [place] and has had back pain since.   Her brother has sarcoidosis. Her dad and 2 daughters have Celiac disease. Aunt with MS.  She has been on meloxicam for many years but helps minimally  She has been on HCQ for about a year but stopped it recently. Did not help the hand pain.   Now on methotrexate 20 mg weekly injection since July 2024.  Not sure if it helping her hand pain. Also on Kevzara every 2 weeks, she has received 2 injections.  She has not been prednisone before.  Per chart review blood work showed negative MYRIAM, RF, CCP and HLA-B27.  Per chart review she had ultrasounds of her hands which noted synovitis.  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    Current medications:  Methotrexate 0.8 mg injection weekly- started July 2024  Kevzara every 2 weeks- started 11/2024  Meloxicam 15 mg daily  Previous medications:  Hydroxychloroquine for 6 months-ineffective 2023 till 2024  NSAIDs, ibuprofen, meloxicam and diclofenac-ineffective  Blood work:  Neg MYRIAM, RF, CCP, HLA-B27        Wt Readings from Last 2  Encounters:   11/29/24 219 lb (99.3 kg)   09/12/24 219 lb (99.3 kg)     Body mass index is 37.59 kg/m².      Current Outpatient Medications   Medication Sig Dispense Refill    Methotrexate 20 MG/0.8ML Subcutaneous Solution Prefilled Syringe       albuterol 108 (90 Base) MCG/ACT Inhalation Aero Soln Inhale 2 puffs into the lungs every 6 (six) hours as needed for Wheezing. 3 each 3    montelukast 10 MG Oral Tab Take 1 tablet (10 mg total) by mouth nightly. 90 tablet 3    valACYclovir 500 MG Oral Tab Take 1 tablet (500 mg total) by mouth daily. 90 tablet 0    fluticasone propionate 50 MCG/ACT Nasal Suspension 2 sprays by Nasal route daily. 48 g 3    levothyroxine 150 MCG Oral Tab Take 1 tablet (150 mcg total) by mouth every morning before breakfast. 90 tablet 1    Meloxicam 15 MG Oral Tab Take 1 tablet (15 mg total) by mouth.      Ivermectin 1 % External Cream         Past Medical History:    Asthma (HCC)    Essential hypertension    Hypothyroidism    s/p FRANCISCO for Graves disease      Past Surgical History:   Procedure Laterality Date    D & c        Family History   Problem Relation Age of Onset    Heart Disorder Other     Cancer Other     Psychiatric Other         depression    Breast Cancer Maternal Aunt 40        40      Social History:  Social History     Socioeconomic History    Marital status:    Tobacco Use    Smoking status: Never    Smokeless tobacco: Never   Vaping Use    Vaping status: Never Used   Substance and Sexual Activity    Alcohol use: Yes     Comment: social    Drug use: Never           REVIEW OF SYSTEMS:   Review Of Systems:  Constitutional: No fever, no change in weight or appetitie  Derm: No rashes, no oral ulcers, no alopecia, no photosensitivity, no psoriasis  HEENT: No dry eyes, no dry mouth, no Raynaud's, no nasal ulcers, no parotid swelling, no neck pain, no jaw pain, no temple pain  Eyes: No visual changes,   CVS: No chest pain, no heart disease  RS: No SOB, no Cough, No Pleurtic  pain,   GI: No nausea, no vomiiting, no abominal pain, no hx of ulcer, no gastritis, no heartburn, no dyshpagia, no BRBPR or melena  : no dysuria, no hx of miscarriages, no DVT Hx, no hx of OCP,   Neuro: No numbness or tingling, no headache, no hx of seizures,   Psych: no hx of anxiety or depression  ENDO: + hx of thyroid disease, no hx of DM  Joint/Muscluskeltal: see HPI,   All other ROS are negative.     EXAM:   /80 (BP Location: Left arm, Patient Position: Sitting, Cuff Size: large)   Pulse 60   Resp 16   Ht 5' 4\" (1.626 m)   Wt 219 lb (99.3 kg)   BMI 37.59 kg/m²   GEN: AAOx3, NAD  HEENT: EOMI, PERRLA, no injection or icterus, oral mucosa moist, no oral lesions. No lymphadenopathy. No facial rash  CVS: RRR, no murmurs rubs or gallops. Equal 2+ distal pulses.   LUNGS: CTAB, no increased work of breathing  ABDOMEN:  soft NT/ND, +BS, no HSM  SKIN: No rashes or skin lesions. No nail findings  MSK:  TTP in PIP joints bilaterally, overlying swelling noted in the PIPs.  No swelling in wrists, shoulders, knees or ankles  NEURO: Cranial nerves II-XII intact grossly. 5/5 strength throughout in both upper and lower extremities, sensation intact.  PSYCH: normal mood    LABS:     Reviewed    IMAGING:     XR hands 7/2024:  FINDINGS: Mild juxta-articular osteopenia involving metacarpal-phalangeal   joints is questioned, this a finding which can be seen with inflammatory   arthropathies. A tiny ossific structure adjacent to the ulnar styloid   process suggests sequela of an old, ununited fracture. No acute fracture,   dislocation or destructive bony process is seen. There are no significant   degenerative manifestations, and no erosive changes are identified.. 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  Received 2 injections  - On meloxicam 15 mg daily  - She has not tried prednisone  - She does have history of planta fasciitis, no history of psoriasis, uveitis.  - Plan to obtain MRI of the right hand for further evaluation  - Will do a trial of prednisone after MRIs done  - For now she will stay on methotrexate, meloxicam and Kevzara.  Could consider switching Biologics if no improvement  - Blood work today    Thank you for allowing me to participate in this patients care. Pt will f/u in 3 mos     Verna Richards MD  11/29/2024  10:52 AM

## 2024-12-02 LAB
ABSOLUTE BASOPHILS: 30 CELLS/UL (ref 0–200)
ABSOLUTE EOSINOPHILS: 90 CELLS/UL (ref 15–500)
ABSOLUTE LYMPHOCYTES: 1488 CELLS/UL (ref 850–3900)
ABSOLUTE MONOCYTES: 361 CELLS/UL (ref 200–950)
ABSOLUTE NEUTROPHILS: 2331 CELLS/UL (ref 1500–7800)
ALBUMIN/GLOBULIN RATIO: 1.9 (CALC) (ref 1–2.5)
ALBUMIN: 4.6 G/DL (ref 3.6–5.1)
ALKALINE PHOSPHATASE: 69 U/L (ref 37–153)
ALT: 27 U/L (ref 6–29)
AST: 21 U/L (ref 10–35)
BASOPHILS: 0.7 %
BILIRUBIN, TOTAL: 0.5 MG/DL (ref 0.2–1.2)
BUN: 18 MG/DL (ref 7–25)
C-REACTIVE PROTEIN: <3 MG/L
CALCIUM: 9.8 MG/DL (ref 8.6–10.4)
CARBON DIOXIDE: 26 MMOL/L (ref 20–32)
CHLORIDE: 104 MMOL/L (ref 98–110)
CREATININE: 0.92 MG/DL (ref 0.5–1.03)
CYCLIC CITRULLINATED$PEPTIDE (CCP) AB (IGG): <16 UNITS
EGFR: 75 ML/MIN/1.73M2
EOSINOPHILS: 2.1 %
GLOBULIN: 2.4 G/DL (CALC) (ref 1.9–3.7)
GLUCOSE: 88 MG/DL (ref 65–139)
HEMATOCRIT: 39.3 % (ref 35–45)
HEMOGLOBIN: 13.4 G/DL (ref 11.7–15.5)
LYMPHOCYTES: 34.6 %
MCH: 32.2 PG (ref 27–33)
MCHC: 34.1 G/DL (ref 32–36)
MCV: 94.5 FL (ref 80–100)
MONOCYTES: 8.4 %
MPV: 11.5 FL (ref 7.5–12.5)
NEUTROPHILS: 54.2 %
PLATELET COUNT: 176 THOUSAND/UL (ref 140–400)
POTASSIUM: 4.5 MMOL/L (ref 3.5–5.3)
PROTEIN, TOTAL: 7 G/DL (ref 6.1–8.1)
RDW: 13.1 % (ref 11–15)
RED BLOOD CELL COUNT: 4.16 MILLION/UL (ref 3.8–5.1)
RHEUMATOID FACTOR: <10 IU/ML
SED RATE BY MODIFIED$WESTERGREN: 2 MM/H
SODIUM: 139 MMOL/L (ref 135–146)
WHITE BLOOD CELL COUNT: 4.3 THOUSAND/UL (ref 3.8–10.8)

## 2025-01-03 ENCOUNTER — TELEPHONE (OUTPATIENT)
Dept: RHEUMATOLOGY | Facility: CLINIC | Age: 50
End: 2025-01-03

## 2025-01-03 RX ORDER — METHYLPREDNISOLONE 4 MG/1
TABLET ORAL
Qty: 1 EACH | Refills: 0 | Status: SHIPPED | OUTPATIENT
Start: 2025-01-03

## 2025-02-03 ENCOUNTER — TELEPHONE (OUTPATIENT)
Dept: RHEUMATOLOGY | Facility: CLINIC | Age: 50
End: 2025-02-03

## 2025-02-03 DIAGNOSIS — M06.00 SERONEGATIVE RHEUMATOID ARTHRITIS (HCC): Primary | ICD-10-CM

## 2025-02-03 NOTE — TELEPHONE ENCOUNTER
If we can get patient approved for Humira every 2 weeks for rheumatoid arthritis  She is on Kevzara but continues to have joint pain

## 2025-02-06 NOTE — TELEPHONE ENCOUNTER
PA start    Prior authorization for: Hyrimoz    Medication form: 40 mg pen     Submission method: [employer]    Tracking #:    QF-TB result: screening labs/ annual QFT

## 2025-02-07 NOTE — TELEPHONE ENCOUNTER
screening labs/ annual QFT?    Script pended for review. Plan preferred Hyrimoz.    PA Approved    Prior authorization for: Hyrimoz    Medication form: 40 mg pen     Approval #: [id number]    Approved dates: 2/06/25 to 2/06/26    Pharmacy for medication: [facility]    QF-TB results:

## 2025-02-10 RX ORDER — ADALIMUMAB-ADAZ 40 MG/.4ML
40 INJECTION, SOLUTION SUBCUTANEOUS
Qty: 2 EACH | Refills: 5 | Status: SHIPPED | OUTPATIENT
Start: 2025-02-10

## 2025-03-11 ENCOUNTER — OFFICE VISIT (OUTPATIENT)
Dept: RHEUMATOLOGY | Facility: CLINIC | Age: 50
End: 2025-03-11
Payer: COMMERCIAL

## 2025-03-11 VITALS
WEIGHT: 220 LBS | HEIGHT: 64 IN | DIASTOLIC BLOOD PRESSURE: 80 MMHG | BODY MASS INDEX: 37.56 KG/M2 | SYSTOLIC BLOOD PRESSURE: 138 MMHG | HEART RATE: 78 BPM

## 2025-03-11 DIAGNOSIS — M06.00 SERONEGATIVE RHEUMATOID ARTHRITIS (HCC): Primary | ICD-10-CM

## 2025-03-11 DIAGNOSIS — M79.641 BILATERAL HAND PAIN: ICD-10-CM

## 2025-03-11 DIAGNOSIS — M79.642 BILATERAL HAND PAIN: ICD-10-CM

## 2025-03-11 PROCEDURE — 99214 OFFICE O/P EST MOD 30 MIN: CPT | Performed by: INTERNAL MEDICINE

## 2025-03-11 RX ORDER — FOLIC ACID 1 MG/1
3 TABLET ORAL DAILY
Qty: 270 TABLET | Refills: 1 | Status: SHIPPED | OUTPATIENT
Start: 2025-03-11

## 2025-03-11 RX ORDER — FOLIC ACID 1 MG/1
1 TABLET ORAL DAILY
COMMUNITY
End: 2025-03-11

## 2025-03-11 NOTE — PROGRESS NOTES
Arlene Quiroga is a 50 year old female.    HPI:     Chief Complaint   Patient presents with    Arthritis     Pt in for f/u, states having some pain in hands and neck- pain 2/10       I had the pleasure of seeing Arlene Quiroga on 3/11/2025 for follow up.     Current medications:  Methotrexate 0.8 mg injection weekly- started July 2024  Humira every 2 weeks- started 2/2025  Meloxicam 15 mg daily  Previous medications:  Hydroxychloroquine for 6 months-ineffective 2023 till 2024  NSAIDs, ibuprofen, meloxicam and diclofenac-ineffective  Kevzara every 2 weeks- started 11/2024-2/2025, ineffective   Blood work:  Neg MYRIAM, ESR, CRP, RF, CCP, HLA-B27  MRI right hand 2/2025: normal    Interval History:  This is a 50 yo F with hx of Grave's disease s/p ablation with acquired hypothyroidism, Asthma referred for joint pain. She was following with rheumatologist Dr. Julieta Gutierrez. She started to have b/l hand pain for the past 3 years around 2021. She has pain in the DIPs, PIPs and MCPs. Most of the pain is in the PIPs and DIPs with swelling in the PIP region. At times hard to make a fist. Has morning stiffness in the hands for about 30 minutes.  Has some pain in right elbow. Also some pain in the right shoulder for many years.  No hx of psoriasis.  She reports a history of bilateral plantar fasciitis for many years.  She received PRP injections in her right foot but did not help.  She hurt her back skiing in [place] and has had back pain since.   Her brother has sarcoidosis. Her dad and 2 daughters have Celiac disease. Aunt with MS.  She has been on meloxicam for many years but helps minimally  She has been on HCQ for about a year but stopped it recently. Did not help the hand pain.   Now on methotrexate 20 mg weekly injection since July 2024.  Not sure if it helping her hand pain. Also on Kevzara every 2 weeks, she has received 2 injections.  She has not been prednisone before.  Per chart review blood work showed  negative MYRIAM, RF, CCP and HLA-B27.  Per chart review she had ultrasounds of her hands which noted synovitis.  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    3/11/2025:  Presents for follow-up of bilateral hand pain.  She had MRI of the right hand done which was essentially normal  She was placed on prednisone which helped her joint pain significantly  She went off Kevzara to transition to Humira having some more joint pain in the hands, when texting noticing some more pain  Having neck pain now, which is new   Has some mild shoulder pain  She injured right knee, fell in November. She had xray of the right know advanced arthritis. Had a cortisone injection Jan 2025 and did help  She did PT for the knee  No psoriasis  Having some sciatica pain in the right calf region and burns           HISTORY:  Past Medical History:    Asthma (HCC)    Essential hypertension    Hypothyroidism    s/p FRANCISCO for Graves disease      Social Hx Reviewed   Family Hx Reviewed     Medications (Active prior to today's visit):  Current Outpatient Medications   Medication Sig Dispense Refill    folic acid 1 MG Oral Tab Take 1 tablet (1 mg total) by mouth daily. Takes 3 mg daily      Adalimumab-adaz (HYRIMOZ) 40 MG/0.4ML Subcutaneous Solution Auto-injector Inject 40 mg into the skin every 14 (fourteen) days. 2 each 5    Methotrexate 20 MG/0.8ML Subcutaneous Solution Prefilled Syringe       montelukast 10 MG Oral Tab Take 1 tablet (10 mg total) by mouth nightly. 90 tablet 3    valACYclovir 500 MG Oral Tab Take 1 tablet (500 mg total) by mouth daily. 90 tablet 0    fluticasone propionate 50 MCG/ACT Nasal Suspension 2 sprays by Nasal route daily. 48 g 3    levothyroxine 150 MCG Oral Tab Take 1 tablet (150 mcg total) by mouth every morning before breakfast. 90 tablet 1    Meloxicam 15 MG Oral Tab Take 1 tablet (15 mg total) by mouth.      Ivermectin 1 % External Cream       methylPREDNISolone 4 MG Oral Tablet Therapy Pack  Take as directed on package. 1 each 0    predniSONE 10 MG Oral Tab 30 mg daily x3 days then 20 mg daily x3 days then 10 mg daily x3 days then stop 20 tablet 0     .cmed  Allergies:  Allergies[1]      ROS:   All other ROS are negative.     PHYSICAL EXAM:   GEN: AAOx3, NAD  HEENT: EOMI, PERRLA, no injection or icterus, oral mucosa moist, no oral lesions. No lymphadenopathy. No facial rash  CVS: RRR, no murmurs rubs or gallops. Equal 2+ distal pulses.   LUNGS: CTAB, no increased work of breathing  ABDOMEN:  soft NT/ND, +BS, no HSM  SKIN: No rashes or skin lesions. +nail changes in both thumbs  MSK:  TTP in PIP joints bilaterally, overlying swelling noted in the PIPs.  No swelling in wrists, shoulders, knees or ankles  NEURO: Cranial nerves II-XII intact grossly. 5/5 strength throughout in both upper and lower extremities, sensation intact.  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  Received 2 doses.  - She noticed on Kevzara that her hand pain was slightly better.  Having  some more pain while using her phone and texting.  She will give Humira some more time  - Blood work today    Right knee pan secondary to osteoarthritis  - X-ray of the right knee showed severe OA  - She received a cortisone injection in January 2025, it helped    Pt will f/u in 3 mos    There is a longitudinal care relationship with me, the care plan reflects the ongoing nature of the continuous relationship of care, and the medical record indicates that there is ongoing treatment of a serious/complex medical condition which I am currently managing.  is Applicable.     Verna Richards MD  3/11/2025   2:48 PM                 [1]   Allergies  Allergen Reactions    Tapazole [Thiamazole]

## 2025-03-28 LAB
ABSOLUTE BASOPHILS: 48 CELLS/UL (ref 0–200)
ABSOLUTE EOSINOPHILS: 68 CELLS/UL (ref 15–500)
ABSOLUTE LYMPHOCYTES: 1660 CELLS/UL (ref 850–3900)
ABSOLUTE MONOCYTES: 408 CELLS/UL (ref 200–950)
ABSOLUTE NEUTROPHILS: 1816 CELLS/UL (ref 1500–7800)
ALT: 44 U/L (ref 6–29)
AST: 31 U/L (ref 10–35)
BASOPHILS: 1.2 %
C-REACTIVE PROTEIN: <3 MG/L
CREATININE: 0.82 MG/DL (ref 0.5–1.03)
EGFR: 86 ML/MIN/1.73M2
EOSINOPHILS: 1.7 %
HEMATOCRIT: 39.3 % (ref 35–45)
HEMOGLOBIN: 13 G/DL (ref 11.7–15.5)
LYMPHOCYTES: 41.5 %
MCH: 31.3 PG (ref 27–33)
MCHC: 33.1 G/DL (ref 32–36)
MCV: 94.5 FL (ref 80–100)
MONOCYTES: 10.2 %
MPV: 11.2 FL (ref 7.5–12.5)
NEUTROPHILS: 45.4 %
PLATELET COUNT: 198 THOUSAND/UL (ref 140–400)
RDW: 12.3 % (ref 11–15)
RED BLOOD CELL COUNT: 4.16 MILLION/UL (ref 3.8–5.1)
SED RATE BY MODIFIED$WESTERGREN: 6 MM/H
WHITE BLOOD CELL COUNT: 4 THOUSAND/UL (ref 3.8–10.8)

## 2025-03-30 ENCOUNTER — PATIENT MESSAGE (OUTPATIENT)
Age: 50
End: 2025-03-30

## 2025-03-31 NOTE — TELEPHONE ENCOUNTER
Please advise.    LOV: 3/11/25  Future Appointments   Date Time Provider Department Center   6/17/2025  5:00 PM Verna Richards MD [facility]     Labs:       Component      Latest Ref Rng 3/27/2025   WBC      3.8 - 10.8 Thousand/uL 4.0    RBC      3.80 - 5.10 Million/uL 4.16    Hemoglobin      11.7 - 15.5 g/dL 13.0    Hematocrit      35.0 - 45.0 % 39.3    MCV      80.0 - 100.0 fL 94.5    MCH      27.0 - 33.0 pg 31.3    MCHC      32.0 - 36.0 g/dL 33.1    RDW      11.0 - 15.0 % 12.3    Platelet Count      140 - 400 Thousand/uL 198    MPV      7.5 - 12.5 fL 11.2    Neutrophils Absolute      1,500 - 7,800 cells/uL 1,816    Lymphocytes Absolute      850 - 3,900 cells/uL 1,660    Monocytes Absolute      200 - 950 cells/uL 408    Eosinophils Absolute      15 - 500 cells/uL 68    Basophils Absolute      0 - 200 cells/uL 48    Neutrophils %      % 45.4    Lymphocytes %      % 41.5    Monocytes %      % 10.2    Eosinophils %      % 1.7    Basophils %      % 1.2    CREATININE      0.50 - 1.03 mg/dL 0.82    EGFR      > OR = 60 mL/min/1.73m2 86    ALT (SGPT)      6 - 29 U/L 44 (H)    AST (SGOT)      10 - 35 U/L 31    C-REACTIVE PROTEIN      <8.0 mg/L <3.0    SED RATE BY MODIFIED$WESTERGREN      < OR = 30 mm/h 6       Legend:  (H) High    ASSESSMENT/PLAN:      Polyarthralgias involving both hands, seronegative RA 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   Received 2 doses.  - She noticed on Kevzara that her hand pain was slightly better.  Having some more pain while using her phone and texting.  She will give Humira some more time  - Blood work today     Right knee pan secondary to osteoarthritis  - X-ray of the right knee showed severe OA  - She received a cortisone injection in January 2025, it helped     Pt will f/u in 3 mos     There is a longitudinal care relationship with me, the care plan reflects the ongoing nature of the continuous relationship of care, and the medical record indicates that there is ongoing treatment of a serious/complex medical condition which I am currently managing.  is Applicable.      Verna Richards MD  3/11/2025   2:48 PM

## 2025-04-03 ENCOUNTER — TELEPHONE (OUTPATIENT)
Age: 50
End: 2025-04-03

## 2025-04-03 NOTE — TELEPHONE ENCOUNTER
Patient is requesting someone call her to speak about her labs. She needs to know if she needs to take her medication since she was told to hold it. Medication is     Methotrexate 20 MG/0.8ML Subcutaneous Solution Prefilled Syringe   Wants to know about her liver labs

## 2025-04-10 NOTE — TELEPHONE ENCOUNTER
Last labs are from March 27 and we discussed at her visit.  Her ALT liver enzyme was slightly high at 44.  Did she get more blood work?    Liver enzyme is slightly high but she still is able to resume methotrexate

## 2025-04-11 NOTE — TELEPHONE ENCOUNTER
Patient returning call, asking for call back. Asking if need to leave message, to please identify who is calling, stated message did not include who call was from.

## 2025-04-14 NOTE — TELEPHONE ENCOUNTER
Patient called back verified patient name and . Patient verbalized understanding but did not have any further blood work since 3/27/25 due to last MyChart with Dr. Richards was to complete labs again in 3 weeks.

## 2025-04-17 ENCOUNTER — TELEPHONE (OUTPATIENT)
Age: 50
End: 2025-04-17

## 2025-04-17 DIAGNOSIS — Z51.81 ENCOUNTER FOR THERAPEUTIC DRUG MONITORING: ICD-10-CM

## 2025-04-17 DIAGNOSIS — R74.8 ABNORMAL LIVER ENZYMES: ICD-10-CM

## 2025-04-17 DIAGNOSIS — M79.641 BILATERAL HAND PAIN: ICD-10-CM

## 2025-04-17 DIAGNOSIS — M06.00 SERONEGATIVE RHEUMATOID ARTHRITIS (HCC): Primary | ICD-10-CM

## 2025-04-17 DIAGNOSIS — M79.642 BILATERAL HAND PAIN: ICD-10-CM

## 2025-04-17 NOTE — TELEPHONE ENCOUNTER
Left message to call back. Does pt need at lab order to complete prior to her follow up appointment with Dr. Richards? Lab order has been placed for [facility] to complete prior to her follow up appointment.       Order placed for abnormal liver enzymes.

## 2025-04-17 NOTE — TELEPHONE ENCOUNTER
See 4/3 telephone encounter    Patient was expecting a call back from the rheumatology nurse.  She wants clarification on the labs    [phone]

## 2025-05-01 ENCOUNTER — PATIENT MESSAGE (OUTPATIENT)
Age: 50
End: 2025-05-01

## 2025-05-06 NOTE — TELEPHONE ENCOUNTER
Name and  verified.       Future Appointments   Date Time Provider Department Center   2025  8:15 AM Verna Richards MD [facility]   2025  5:00 PM Verna Richards MD [facility] [place]

## 2025-05-06 NOTE — TELEPHONE ENCOUNTER
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

## 2025-05-06 NOTE — TELEPHONE ENCOUNTER
For medication changes it would be best if she can be seen.  I have availability on this Wednesday, May 7 at 2 PM.  Insurance would want documentation that medication was not working    I could also do video visit she cannot come in

## 2025-05-08 ENCOUNTER — TELEPHONE (OUTPATIENT)
Dept: RHEUMATOLOGY | Facility: CLINIC | Age: 50
End: 2025-05-08

## 2025-05-08 ENCOUNTER — TELEMEDICINE (OUTPATIENT)
Age: 50
End: 2025-05-08
Payer: COMMERCIAL

## 2025-05-08 DIAGNOSIS — Z51.81 ENCOUNTER FOR THERAPEUTIC DRUG MONITORING: ICD-10-CM

## 2025-05-08 DIAGNOSIS — M79.641 BILATERAL HAND PAIN: ICD-10-CM

## 2025-05-08 DIAGNOSIS — M06.00 SERONEGATIVE RHEUMATOID ARTHRITIS (HCC): Primary | ICD-10-CM

## 2025-05-08 DIAGNOSIS — M79.642 BILATERAL HAND PAIN: ICD-10-CM

## 2025-05-08 PROCEDURE — 98006 SYNCH AUDIO-VIDEO EST MOD 30: CPT | Performed by: INTERNAL MEDICINE

## 2025-05-08 NOTE — PROGRESS NOTES
Arlene Quiroga is a 50 year old female.    HPI:     No chief complaint on file.      I had the pleasure of seeing Arlene Quiroga on 5/8/2025 for follow up.     Current medications:  Methotrexate 0.8 mg injection weekly- started July 2024  Humira every 2 weeks- started 2/2025  Meloxicam 15 mg daily  Previous medications:  Hydroxychloroquine for 6 months-ineffective 2023 till 2024  NSAIDs, ibuprofen, meloxicam and diclofenac-ineffective  Kevzara every 2 weeks- started 11/2024-2/2025, ineffective   Blood work:  Neg MYRIAM, ESR, CRP, RF, CCP, HLA-B27  MRI right hand 2/2025: normal    Interval History:  This is a 50 yo F with hx of Grave's disease s/p ablation with acquired hypothyroidism, Asthma referred for joint pain. She was following with rheumatologist Dr. Julieta Gutierrez. She started to have b/l hand pain for the past 3 years around 2021. She has pain in the DIPs, PIPs and MCPs. Most of the pain is in the PIPs and DIPs with swelling in the PIP region. At times hard to make a fist. Has morning stiffness in the hands for about 30 minutes.  Has some pain in right elbow. Also some pain in the right shoulder for many years.  No hx of psoriasis.  She reports a history of bilateral plantar fasciitis for many years.  She received PRP injections in her right foot but did not help.  She hurt her back skiing in [place] and has had back pain since.   Her brother has sarcoidosis. Her dad and 2 daughters have Celiac disease. Aunt with MS.  She has been on meloxicam for many years but helps minimally  She has been on HCQ for about a year but stopped it recently. Did not help the hand pain.   Now on methotrexate 20 mg weekly injection since July 2024.  Not sure if it helping her hand pain. Also on Kevzara every 2 weeks, she has received 2 injections.  She has not been prednisone before.  Per chart review blood work showed negative MYRIAM, RF, CCP and HLA-B27.  Per chart review she had ultrasounds of her hands which noted  synovitis.  She works as an RN at [employer] and ultrasounds were done in office.    3/11/2025:  Presents for follow-up of bilateral hand pain.  She had MRI of the right hand done which was essentially normal  She was placed on prednisone which helped her joint pain significantly  She went off Kevzara to transition to Humira having some more joint pain in the hands, when texting noticing some more pain  Having neck pain now, which is new   Has some mild shoulder pain  She injured right knee, fell in November. She had xray of the right know advanced arthritis. Had a cortisone injection Jan 2025 and did help  She did PT for the knee  No psoriasis  Having some sciatica pain in the right calf region and burns     5/9/2025:  Presents for follow-up of bilateral hand pain.  She had MRI of the right hand done which was essentially normal  She was placed on prednisone which helped her joint pain significantly  She is now on Humira every 2 weeks since Feb 2025  Having more pain and swelling in the hands  Had b/l knee's injected with cortisone on Monday, it helped significantly  Feels like Humira is not working.      HISTORY:  Past Medical History:    Asthma (HCC)    Essential hypertension    Hypothyroidism    s/p FRANCISCO for Graves disease      Social Hx Reviewed   Family Hx Reviewed     Medications (Active prior to today's visit):  Current Outpatient Medications   Medication Sig Dispense Refill    folic acid 1 MG Oral Tab Take 3 tablets (3 mg total) by mouth daily. Takes 3 mg daily 270 tablet 1    Adalimumab-adaz (HYRIMOZ) 40 MG/0.4ML Subcutaneous Solution Auto-injector Inject 40 mg into the skin every 14 (fourteen) days. 2 each 5    Methotrexate 20 MG/0.8ML Subcutaneous Solution Prefilled Syringe       montelukast 10 MG Oral Tab Take 1 tablet (10 mg total) by mouth nightly. 90 tablet 3    valACYclovir 500 MG Oral Tab Take 1 tablet (500 mg total) by mouth daily. 90 tablet 0    fluticasone propionate 50 MCG/ACT Nasal  Suspension 2 sprays by Nasal route daily. 48 g 3    Meloxicam 15 MG Oral Tab Take 1 tablet (15 mg total) by mouth.      Ivermectin 1 % External Cream        .cmed  Allergies:  Allergies[1]      ROS:   All other ROS are negative.     PHYSICAL EXAM:   GEN: AAOx3, NAD  HEENT: EOMI, PERRLA, no injection or icterus, oral mucosa moist, no oral lesions. No lymphadenopathy. No facial rash  CVS: RRR, no murmurs rubs or gallops. Equal 2+ distal pulses.   LUNGS: CTAB, no increased work of breathing  ABDOMEN:  soft NT/ND, +BS, no HSM  SKIN: No rashes or skin lesions. +nail changes in both thumbs  MSK:  TTP in PIP joints bilaterally, overlying swelling noted in the PIPs.  No swelling in wrists, shoulders, knees or ankles  NEURO: Cranial nerves II-XII intact grossly. 5/5 strength throughout in both upper and lower extremities, sensation intact.  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   - Started to have symptoms and 2021 in b/l hands mostly PIPs, sparing MCPs  - Recent x-rays done 7/2024 showing mild juxta-articular osteopenia involving metacarpophalangeal joints  - Per notes outside MRI of the SI joints in 2022 showed no sacroiliitis  - Per notes there was positive Doppler signal in synovium on ultrasound 6/26/2024 at patient's work office by rheumatology  - Recent MRI Right hand 2/2024 was normal   - No improvement on hydroxychloroquine  - joint pain improved with prednisone  - Now on methotrexate 20 mg SubQ weekly since July 2024  - She has been on Humira since February, minimal improvement.  Joints feel worse on Humira  - She noticed on Kevzara that her hand  pain was slightly better.    - Plan to get approved for Rinvoq.  Risk/benefits discussed with patient.  Discussed risk of blood clots, reactivation of shingles, have to monitor the kidney and liver test on the medication.  - Blood work next week    B/L knee pan secondary to osteoarthritis  - X-ray of the right knee showed severe OA  - She received a cortisone injection in January 2025, it helped  - b/l knees were injected with cortisone on Monday by orthopedic.  She states that there is a lot of fluid but they do not drain it    Pt will f/u in 3 mos    There is a longitudinal care relationship with me, the care plan reflects the ongoing nature of the continuous relationship of care, and the medical record indicates that there is ongoing treatment of a serious/complex medical condition which I am currently managing.  is Applicable.     Verna Richards MD  5/8/2025   8:15 AM                 [1]   Allergies  Allergen Reactions    Tapazole [Thiamazole]

## 2025-05-08 NOTE — TELEPHONE ENCOUNTER
If we can get patient approved for Rinvoq for rheumatoid arthritis.  She is on Humira but having worsening joint pain and swelling

## 2025-05-13 NOTE — TELEPHONE ENCOUNTER
PA start    Prior authorization for: Rinvoq     Medication form: 15 mg tablet    Submission method: SeeMore Interactive    Tracking #:    [id number] result: ordered 2/10/25    Negative 9/24/24

## 2025-05-14 RX ORDER — UPADACITINIB 15 MG/1
15 TABLET, EXTENDED RELEASE ORAL DAILY
Qty: 30 TABLET | Refills: 5 | Status: SHIPPED | OUTPATIENT
Start: 2025-05-14

## 2025-05-14 NOTE — TELEPHONE ENCOUNTER
Script pended for review. Did pt stop Humira?    PA Approved    Prior authorization for: Rinvoq     Medication form: 15 mg tablet    Approval #: [id number]    Approved dates: 5/13/25 to 5/13/26    Pharmacy for medication: [facility]

## 2025-05-14 NOTE — TELEPHONE ENCOUNTER
I do not think she stopped Humira.  If she can stop Humira and start the new medication 2 weeks later

## 2025-05-15 ENCOUNTER — PATIENT MESSAGE (OUTPATIENT)
Age: 50
End: 2025-05-15

## 2025-05-15 LAB
ALBUMIN/GLOBULIN RATIO: 1.7 (CALC) (ref 1–2.5)
ALBUMIN: 4.3 G/DL (ref 3.6–5.1)
ALKALINE PHOSPHATASE: 67 U/L (ref 37–153)
ALT: 31 U/L (ref 6–29)
AST: 23 U/L (ref 10–35)
BILIRUBIN, DIRECT: 0.1 MG/DL
BILIRUBIN, INDIRECT: 0.5 MG/DL (CALC) (ref 0.2–1.2)
BILIRUBIN, TOTAL: 0.6 MG/DL (ref 0.2–1.2)
GLOBULIN: 2.5 G/DL (CALC) (ref 1.9–3.7)
PROTEIN, TOTAL: 6.8 G/DL (ref 6.1–8.1)

## 2025-05-15 NOTE — TELEPHONE ENCOUNTER
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   - Started to have symptoms and 2021 in b/l hands mostly PIPs, sparing MCPs  - Recent x-rays done 7/2024 showing mild juxta-articular osteopenia involving metacarpophalangeal joints  - Per notes outside MRI of the SI joints in 2022 showed no sacroiliitis  - Per notes there was positive Doppler signal in synovium on ultrasound 6/26/2024 at patient's work office by rheumatology  - Recent MRI Right hand 2/2024 was normal   - No improvement on hydroxychloroquine  - joint pain improved with prednisone  - Now on methotrexate 20 mg SubQ weekly since July 2024  - She has been on Humira since February, minimal improvement.  Joints feel worse on Humira  - She noticed on Kevzara that her hand pain was slightly better.    - Plan to get approved for Rinvoq.  Risk/benefits discussed with patient.  Discussed risk of blood clots, reactivation of shingles, have to monitor the kidney and liver test on the medication.  - Blood work next week     B/L knee pan secondary to osteoarthritis  - X-ray of the right knee showed severe OA  - She received a cortisone injection in January 2025, it helped  - b/l knees were injected with cortisone on Monday by orthopedic.  She states that there is a lot of fluid but they do not drain it     Pt will f/u in 3 mos

## 2025-05-20 ENCOUNTER — TELEPHONE (OUTPATIENT)
Age: 50
End: 2025-05-20

## 2025-05-20 DIAGNOSIS — M06.00 SERONEGATIVE RHEUMATOID ARTHRITIS (HCC): Primary | ICD-10-CM

## 2025-05-20 NOTE — TELEPHONE ENCOUNTER
Outpatient Medication Detail     Disp Refills Start End    Methotrexate 20 MG/0.8ML Subcutaneous Solution Prefilled Syringe 9.6 mL 1 5/20/2025 --    Sig - Route: Inject 0.8 mL into the skin once a week. METHOTREXATE 20 MG/0.8ML SC SOSY - Subcutaneous    Sent to pharmacy as: Methotrexate 20 MG/0.8ML Subcutaneous Solution Prefilled Syringe    E-Prescribing Status: Receipt confirmed by pharmacy (5/20/2025  9:11 AM CDT)

## 2025-05-20 NOTE — TELEPHONE ENCOUNTER
Patient calling and states the medication was sent to the wrong pharmacy, and she requested a 90 day supply. Patient states she requested this last week and she is out of the medication.     Please correct this. Also removed [facility] from list of pharmacies.     Pls send refill to   [id number] - [place], [place] - [address] AT [facility], [phone], [phone]   [address] [place] [facility] [address]   Phone: [phone] Fax: [phone]   Hours: Open 24 hours

## 2025-05-20 NOTE — TELEPHONE ENCOUNTER
Current Outpatient Medications   Medication Sig Dispense Refill    Methotrexate 20 MG/0.8ML Subcutaneous Solution Prefilled Syringe Inject 0.8 mL into the skin once a week. METHOTREXATE 20 MG/0.8ML SC SOSY 9.6 mL 1       We have the otrexup 20mg/0.4mL syringes available. Please send in new rx if appropriate.

## 2025-05-22 ENCOUNTER — TELEPHONE (OUTPATIENT)
Age: 50
End: 2025-05-22

## 2025-05-22 NOTE — TELEPHONE ENCOUNTER
Patient is calling for status of her message. [facility] [place] [facility] not [facility] the brand of medication. [facility] the [facility] Who also [facility] not [facility] brand. Patient would like a call back today to discuss this.

## 2025-05-22 NOTE — TELEPHONE ENCOUNTER
Called [facility] pharmacy they do not carry the methotrexate prefilled syringes. They do carry the 50mg/2mL vials.    Called patient and patient is actually using the vials and is not using prefilled syringes.    Please approve scripts pended. These are what she was getting through the Rheumatologist at [facility] verified through Care Everywhere.

## 2025-05-23 RX ORDER — METHOTREXATE 25 MG/ML
0.8 INJECTION, SOLUTION INTRAMUSCULAR; INTRATHECAL; INTRAVENOUS; SUBCUTANEOUS WEEKLY
Qty: 6 EACH | Refills: 0 | Status: SHIPPED | OUTPATIENT
Start: 2025-05-23

## 2025-05-23 RX ORDER — SYRINGE-NEEDLE,INSULIN,0.5 ML 28GX1/2"
SYRINGE, EMPTY DISPOSABLE MISCELLANEOUS
Qty: 25 EACH | Refills: 0 | Status: SHIPPED | OUTPATIENT
Start: 2025-05-23

## 2025-05-23 RX ORDER — NEEDLES, DISPOSABLE 25GX5/8"
NEEDLE, DISPOSABLE MISCELLANEOUS
Qty: 50 EACH | Refills: 0 | Status: SHIPPED | OUTPATIENT
Start: 2025-05-23

## 2025-05-23 NOTE — TELEPHONE ENCOUNTER
Patient called to follow up on the request. Patient states they are completely out of the medications.     Please call the patient back directly.

## 2025-05-23 NOTE — TELEPHONE ENCOUNTER
Called [facility] to confirm the cancellation of the methotrexate prefilled syringes script. Confirmed that script was discontinued

## 2025-05-23 NOTE — TELEPHONE ENCOUNTER
Called [facility] pharmacy to verify they received the prescription for methotrexate vials and that they were still in stock. Per pharmacy staff they are in stock and can be picked up today.    Attempted to contact patient. Left message to make patient aware of above.

## 2025-06-03 ENCOUNTER — PATIENT MESSAGE (OUTPATIENT)
Age: 50
End: 2025-06-03

## 2025-06-10 NOTE — TELEPHONE ENCOUNTER
I am little confused by her question.  Is she wanting me to fill out the form so she does not have to get the measles vaccine    She is still able to get if she wants to but would have to hold her medications

## 2025-07-09 DIAGNOSIS — M06.00 SERONEGATIVE RHEUMATOID ARTHRITIS (HCC): ICD-10-CM

## 2025-07-09 NOTE — TELEPHONE ENCOUNTER
Requested Prescriptions     Pending Prescriptions Disp Refills    METHOTREXATE SODIUM 50 MG/2ML Injection Solution [Pharmacy Med Name: METHOTREXATE 50 MG/2 ML VIAL] 6 mL 0     Sig: INJECT 0.8 ML AS DIRECTED ONCE A WEEK.     Future Appointments   Date Time Provider Department Center   8/26/2025  5:20 PM Verna Richards MD [facility]   10/7/2025  4:20 PM Verna Richards MD [facility]     LOV: 3/11/25   Last refilled:5/23/25 #6 Each 0RF   Labs:  Component      Latest Ref Rng 3/27/2025   WBC      3.8 - 10.8 Thousand/uL 4.0    RBC      3.80 - 5.10 Million/uL 4.16    Hemoglobin      11.7 - 15.5 g/dL 13.0    Hematocrit      35.0 - 45.0 % 39.3    MCV      80.0 - 100.0 fL 94.5    MCH      27.0 - 33.0 pg 31.3    MCHC      32.0 - 36.0 g/dL 33.1    RDW      11.0 - 15.0 % 12.3    Platelet Count      140 - 400 Thousand/uL 198    MPV      7.5 - 12.5 fL 11.2    Neutrophils Absolute      1,500 - 7,800 cells/uL 1,816    Lymphocytes Absolute      850 - 3,900 cells/uL 1,660    Monocytes Absolute      200 - 950 cells/uL 408    Eosinophils Absolute      15 - 500 cells/uL 68    Basophils Absolute      0 - 200 cells/uL 48    Neutrophils %      % 45.4    Lymphocytes %      % 41.5    Monocytes %      % 10.2    Eosinophils %      % 1.7    Basophils %      % 1.2    CREATININE      0.50 - 1.03 mg/dL 0.82    EGFR      > OR = 60 mL/min/1.73m2 86    ALT (SGPT)      6 - 29 U/L 44 (H)    AST (SGOT)      10 - 35 U/L 31    C-REACTIVE PROTEIN      <8.0 mg/L <3.0    SED RATE BY MODIFIED$WESTERGREN      < OR = 30 mm/h 6       Legend:  (H) High    ASSESSMENT/PLAN:      Polyarthralgias involving both hands, 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  Received 2 doses.  - She noticed on Kevzara that her hand pain was slightly better.  Having some more pain while using her phone and texting.  She will give Humira some more time  - Blood work today     Right knee pan secondary to osteoarthritis  - X-ray of the right knee showed severe OA  - She received a cortisone injection in January 2025, it helped     Pt will f/u in 3 mos     There is a longitudinal care relationship with me, the care plan reflects the ongoing nature of the continuous relationship of care, and the medical record indicates that there is ongoing treatment of a serious/complex medical condition which I am currently managing.  is Applicable.      Verna Richards MD  3/11/2025   2:48 PM

## 2025-07-13 RX ORDER — METHOTREXATE 25 MG/ML
0.8 INJECTION, SOLUTION INTRAMUSCULAR; INTRATHECAL; INTRAVENOUS; SUBCUTANEOUS WEEKLY
Qty: 9.6 ML | Refills: 1 | Status: SHIPPED | OUTPATIENT
Start: 2025-07-13

## 2025-08-18 ENCOUNTER — PATIENT MESSAGE (OUTPATIENT)
Age: 50
End: 2025-08-18

## 2025-08-19 ENCOUNTER — TELEPHONE (OUTPATIENT)
Age: 50
End: 2025-08-19

## 2025-08-19 ENCOUNTER — EKG ENCOUNTER (OUTPATIENT)
Dept: LAB | Facility: HOSPITAL | Age: 50
End: 2025-08-19
Attending: ORTHOPAEDIC SURGERY

## 2025-08-19 DIAGNOSIS — M25.569 KNEE PAIN: Primary | ICD-10-CM

## 2025-08-19 DIAGNOSIS — M25.562 PAIN IN LEFT KNEE: ICD-10-CM

## 2025-08-19 DIAGNOSIS — M25.561 PAIN IN RIGHT KNEE: ICD-10-CM

## 2025-08-19 PROCEDURE — 93005 ELECTROCARDIOGRAM TRACING: CPT

## 2025-08-19 PROCEDURE — 93010 ELECTROCARDIOGRAM REPORT: CPT | Performed by: INTERNAL MEDICINE

## 2025-08-20 LAB
ATRIAL RATE: 60 BPM
P AXIS: 63 DEGREES
P-R INTERVAL: 194 MS
Q-T INTERVAL: 420 MS
QRS DURATION: 96 MS
QTC CALCULATION (BEZET): 420 MS
R AXIS: 13 DEGREES
T AXIS: 45 DEGREES
VENTRICULAR RATE: 60 BPM

## 2025-08-26 ENCOUNTER — OFFICE VISIT (OUTPATIENT)
Age: 50
End: 2025-08-26

## 2025-08-26 VITALS
HEART RATE: 73 BPM | HEIGHT: 64 IN | WEIGHT: 200 LBS | SYSTOLIC BLOOD PRESSURE: 114 MMHG | DIASTOLIC BLOOD PRESSURE: 73 MMHG | BODY MASS INDEX: 34.15 KG/M2

## 2025-08-26 DIAGNOSIS — Z51.81 ENCOUNTER FOR THERAPEUTIC DRUG MONITORING: ICD-10-CM

## 2025-08-26 DIAGNOSIS — M06.00 SERONEGATIVE RHEUMATOID ARTHRITIS (HCC): ICD-10-CM

## 2025-08-26 DIAGNOSIS — M79.642 BILATERAL HAND PAIN: Primary | ICD-10-CM

## 2025-08-26 DIAGNOSIS — M79.641 BILATERAL HAND PAIN: Primary | ICD-10-CM

## 2025-08-26 PROCEDURE — 99214 OFFICE O/P EST MOD 30 MIN: CPT | Performed by: INTERNAL MEDICINE

## 2025-08-26 RX ORDER — LEVOTHYROXINE SODIUM 125 UG/1
125 TABLET ORAL
COMMUNITY

## 2025-08-26 RX ORDER — FOLIC ACID 1 MG/1
3 TABLET ORAL DAILY
Qty: 270 TABLET | Refills: 3 | Status: SHIPPED | OUTPATIENT
Start: 2025-08-26

## 2025-08-26 RX ORDER — METHOTREXATE 25 MG/ML
0.8 INJECTION, SOLUTION INTRAMUSCULAR; INTRATHECAL; INTRAVENOUS; SUBCUTANEOUS WEEKLY
Qty: 10 ML | Refills: 3 | Status: SHIPPED | OUTPATIENT
Start: 2025-08-26